# Patient Record
Sex: MALE | Race: WHITE | NOT HISPANIC OR LATINO | Employment: OTHER | URBAN - NONMETROPOLITAN AREA
[De-identification: names, ages, dates, MRNs, and addresses within clinical notes are randomized per-mention and may not be internally consistent; named-entity substitution may affect disease eponyms.]

---

## 2023-01-28 PROBLEM — G47.30 SLEEP APNEA: Status: ACTIVE | Noted: 2023-01-28

## 2023-01-28 PROBLEM — J44.9 COPD (CHRONIC OBSTRUCTIVE PULMONARY DISEASE) (MULTI): Status: ACTIVE | Noted: 2023-01-28

## 2023-01-28 PROBLEM — I10 BENIGN ESSENTIAL HYPERTENSION: Status: ACTIVE | Noted: 2023-01-28

## 2023-01-28 PROBLEM — E66.9 DIABETES MELLITUS TYPE 2 IN OBESE: Status: ACTIVE | Noted: 2023-01-28

## 2023-01-28 PROBLEM — U07.1 COVID-19: Status: ACTIVE | Noted: 2023-01-28

## 2023-01-28 PROBLEM — N52.9 ERECTILE DYSFUNCTION: Status: ACTIVE | Noted: 2023-01-28

## 2023-01-28 PROBLEM — I10 HYPERTENSION, UNCONTROLLED: Status: ACTIVE | Noted: 2023-01-28

## 2023-01-28 PROBLEM — F41.8 DEPRESSION WITH ANXIETY: Status: ACTIVE | Noted: 2023-01-28

## 2023-01-28 PROBLEM — G47.36 SLEEP RELATED HYPOVENTILATION IN CONDITIONS CLASSIFIED ELSEWHERE: Status: ACTIVE | Noted: 2023-01-28

## 2023-01-28 PROBLEM — M62.830 BACK MUSCLE SPASM: Status: ACTIVE | Noted: 2023-01-28

## 2023-01-28 PROBLEM — M79.10 MYALGIA: Status: ACTIVE | Noted: 2023-01-28

## 2023-01-28 PROBLEM — Z98.84 BARIATRIC SURGERY STATUS: Status: ACTIVE | Noted: 2023-01-28

## 2023-01-28 PROBLEM — M1A.40X0: Status: ACTIVE | Noted: 2023-01-28

## 2023-01-28 PROBLEM — G47.19 EXCESSIVE DAYTIME SLEEPINESS: Status: ACTIVE | Noted: 2023-01-28

## 2023-01-28 PROBLEM — F17.200 NICOTINE DEPENDENCE: Status: ACTIVE | Noted: 2023-01-28

## 2023-01-28 PROBLEM — R39.9 URINARY SYMPTOM OR SIGN: Status: ACTIVE | Noted: 2023-01-28

## 2023-01-28 PROBLEM — B95.8: Status: ACTIVE | Noted: 2023-01-28

## 2023-01-28 PROBLEM — M1A.9XX0 CHRONIC GOUT WITHOUT TOPHUS: Status: ACTIVE | Noted: 2023-01-28

## 2023-01-28 PROBLEM — E66.01 MORBID OBESITY WITH BMI OF 45.0-49.9, ADULT (MULTI): Status: ACTIVE | Noted: 2023-01-28

## 2023-01-28 PROBLEM — N49.2 CELLULITIS, SCROTUM: Status: ACTIVE | Noted: 2023-01-28

## 2023-01-28 PROBLEM — E78.5 HYPERLIPIDEMIA: Status: ACTIVE | Noted: 2023-01-28

## 2023-01-28 PROBLEM — L72.3 SCROTAL SEBACEOUS CYST: Status: ACTIVE | Noted: 2023-01-28

## 2023-01-28 PROBLEM — J44.1 COPD EXACERBATION (MULTI): Status: ACTIVE | Noted: 2023-01-28

## 2023-01-28 PROBLEM — A04.8 HELICOBACTER PYLORI INFECTION: Status: ACTIVE | Noted: 2023-01-28

## 2023-01-28 PROBLEM — E55.9 MILD VITAMIN D DEFICIENCY: Status: ACTIVE | Noted: 2023-01-28

## 2023-01-28 PROBLEM — I25.10 CAD (CORONARY ARTERY DISEASE): Status: ACTIVE | Noted: 2023-01-28

## 2023-01-28 PROBLEM — R73.01 IMPAIRED FASTING GLUCOSE: Status: ACTIVE | Noted: 2023-01-28

## 2023-01-28 PROBLEM — E11.69 DIABETES MELLITUS TYPE 2 IN OBESE: Status: ACTIVE | Noted: 2023-01-28

## 2023-01-28 PROBLEM — G47.33 OBSTRUCTIVE SLEEP APNEA OF ADULT: Status: ACTIVE | Noted: 2023-01-28

## 2023-01-28 PROBLEM — H00.015 HORDEOLUM EXTERNUM OF LEFT LOWER EYELID: Status: ACTIVE | Noted: 2023-01-28

## 2023-01-28 PROBLEM — A60.00 HERPES GENITALIS: Status: ACTIVE | Noted: 2023-01-28

## 2023-01-28 PROBLEM — N20.0 KIDNEY CALCULI: Status: ACTIVE | Noted: 2023-01-28

## 2023-01-28 PROBLEM — J30.0 VASOMOTOR RHINITIS: Status: ACTIVE | Noted: 2023-01-28

## 2023-01-28 PROBLEM — E03.9 HYPOTHYROID: Status: ACTIVE | Noted: 2023-01-28

## 2023-01-28 PROBLEM — I21.3 STEMI (ST ELEVATION MYOCARDIAL INFARCTION) (MULTI): Status: ACTIVE | Noted: 2023-01-28

## 2023-01-28 PROBLEM — R05.9 COUGH: Status: ACTIVE | Noted: 2023-01-28

## 2023-01-28 PROBLEM — M19.079 ARTHRITIS OF FOOT: Status: ACTIVE | Noted: 2023-01-28

## 2023-01-28 PROBLEM — E78.6 LOW HDL (UNDER 40): Status: ACTIVE | Noted: 2023-01-28

## 2023-01-28 PROBLEM — I49.3 FREQUENT PVCS: Status: ACTIVE | Noted: 2023-01-28

## 2023-01-28 PROBLEM — R31.9 HEMATURIA: Status: ACTIVE | Noted: 2023-01-28

## 2023-01-28 PROBLEM — H01.006: Status: ACTIVE | Noted: 2023-01-28

## 2023-01-28 PROBLEM — J32.9 SINUSITIS: Status: ACTIVE | Noted: 2023-01-28

## 2023-01-28 RX ORDER — FLUTICASONE FUROATE, UMECLIDINIUM BROMIDE AND VILANTEROL TRIFENATATE 100; 62.5; 25 UG/1; UG/1; UG/1
1 POWDER RESPIRATORY (INHALATION) DAILY
COMMUNITY
End: 2023-03-13 | Stop reason: ALTCHOICE

## 2023-01-28 RX ORDER — TOPIRAMATE 25 MG/1
2 TABLET ORAL DAILY
COMMUNITY
Start: 2019-01-10

## 2023-01-28 RX ORDER — CARVEDILOL 12.5 MG/1
0.5 TABLET ORAL EVERY MORNING
COMMUNITY
Start: 2019-04-19 | End: 2023-04-27 | Stop reason: SDUPTHER

## 2023-01-28 RX ORDER — ASPIRIN 81 MG/1
1 TABLET ORAL DAILY
COMMUNITY
Start: 2018-06-06

## 2023-01-28 RX ORDER — DULAGLUTIDE 0.75 MG/.5ML
1.5 INJECTION, SOLUTION SUBCUTANEOUS
COMMUNITY
Start: 2021-09-09 | End: 2023-07-26 | Stop reason: SDUPTHER

## 2023-01-28 RX ORDER — LOSARTAN POTASSIUM 100 MG/1
1 TABLET ORAL DAILY
COMMUNITY
Start: 2022-05-02 | End: 2023-04-27 | Stop reason: SDUPTHER

## 2023-01-28 RX ORDER — ROSUVASTATIN CALCIUM 40 MG/1
1 TABLET, COATED ORAL DAILY
COMMUNITY
Start: 2018-06-06 | End: 2024-04-04 | Stop reason: SINTOL

## 2023-01-28 RX ORDER — ALBUTEROL SULFATE 90 UG/1
2 AEROSOL, METERED RESPIRATORY (INHALATION)
COMMUNITY
Start: 2020-05-18 | End: 2023-04-27 | Stop reason: SDUPTHER

## 2023-01-28 RX ORDER — BUPROPION HYDROCHLORIDE 150 MG/1
1 TABLET ORAL DAILY
COMMUNITY
Start: 2019-01-10 | End: 2023-04-27 | Stop reason: SDUPTHER

## 2023-01-28 RX ORDER — ACETAMINOPHEN 500 MG
1 TABLET ORAL
COMMUNITY
Start: 2014-06-02

## 2023-01-28 RX ORDER — LEVOTHYROXINE SODIUM 50 UG/1
1 TABLET ORAL DAILY
COMMUNITY
Start: 2018-08-17 | End: 2023-03-13 | Stop reason: SDUPTHER

## 2023-01-28 RX ORDER — NITROGLYCERIN 0.4 MG/1
0.4 TABLET SUBLINGUAL EVERY 5 MIN PRN
COMMUNITY
Start: 2018-07-09 | End: 2024-04-23 | Stop reason: HOSPADM

## 2023-01-28 RX ORDER — SILDENAFIL 100 MG/1
100 TABLET, FILM COATED ORAL DAILY
COMMUNITY
Start: 2018-08-16 | End: 2023-10-18 | Stop reason: SDUPTHER

## 2023-03-10 LAB
ALANINE AMINOTRANSFERASE (SGPT) (U/L) IN SER/PLAS: 28 U/L (ref 10–52)
ALBUMIN (G/DL) IN SER/PLAS: 4.2 G/DL (ref 3.4–5)
ALBUMIN (MG/L) IN URINE: NORMAL
ALBUMIN/CREATININE (UG/MG) IN URINE: NORMAL
ALKALINE PHOSPHATASE (U/L) IN SER/PLAS: 56 U/L (ref 33–120)
ANION GAP IN SER/PLAS: 14 MMOL/L (ref 10–20)
ASPARTATE AMINOTRANSFERASE (SGOT) (U/L) IN SER/PLAS: 27 U/L (ref 9–39)
BASOPHILS (10*3/UL) IN BLOOD BY AUTOMATED COUNT: 0.07 X10E9/L (ref 0–0.1)
BASOPHILS/100 LEUKOCYTES IN BLOOD BY AUTOMATED COUNT: 0.8 % (ref 0–2)
BILIRUBIN TOTAL (MG/DL) IN SER/PLAS: 0.6 MG/DL (ref 0–1.2)
CALCIUM (MG/DL) IN SER/PLAS: 9.4 MG/DL (ref 8.6–10.3)
CARBON DIOXIDE, TOTAL (MMOL/L) IN SER/PLAS: 24 MMOL/L (ref 21–32)
CHLORIDE (MMOL/L) IN SER/PLAS: 104 MMOL/L (ref 98–107)
CHOLESTEROL (MG/DL) IN SER/PLAS: 198 MG/DL (ref 0–199)
CHOLESTEROL IN HDL (MG/DL) IN SER/PLAS: 37 MG/DL
CHOLESTEROL/HDL RATIO: 5.4
CREATININE (MG/DL) IN SER/PLAS: 1 MG/DL (ref 0.5–1.3)
CREATININE (MG/DL) IN URINE: NORMAL
EOSINOPHILS (10*3/UL) IN BLOOD BY AUTOMATED COUNT: 0.53 X10E9/L (ref 0–0.7)
EOSINOPHILS/100 LEUKOCYTES IN BLOOD BY AUTOMATED COUNT: 5.9 % (ref 0–6)
ERYTHROCYTE DISTRIBUTION WIDTH (RATIO) BY AUTOMATED COUNT: 14.2 % (ref 11.5–14.5)
ERYTHROCYTE MEAN CORPUSCULAR HEMOGLOBIN CONCENTRATION (G/DL) BY AUTOMATED: 31.6 G/DL (ref 32–36)
ERYTHROCYTE MEAN CORPUSCULAR VOLUME (FL) BY AUTOMATED COUNT: 94 FL (ref 80–100)
ERYTHROCYTES (10*6/UL) IN BLOOD BY AUTOMATED COUNT: 4.48 X10E12/L (ref 4.5–5.9)
GFR MALE: 89 ML/MIN/1.73M2
GLUCOSE (MG/DL) IN SER/PLAS: 86 MG/DL (ref 74–99)
HEMATOCRIT (%) IN BLOOD BY AUTOMATED COUNT: 42.1 % (ref 41–52)
HEMOGLOBIN (G/DL) IN BLOOD: 13.3 G/DL (ref 13.5–17.5)
IMMATURE GRANULOCYTES/100 LEUKOCYTES IN BLOOD BY AUTOMATED COUNT: 0.2 % (ref 0–0.9)
LDL: 139 MG/DL (ref 0–99)
LEUKOCYTES (10*3/UL) IN BLOOD BY AUTOMATED COUNT: 9 X10E9/L (ref 4.4–11.3)
LYMPHOCYTES (10*3/UL) IN BLOOD BY AUTOMATED COUNT: 3.22 X10E9/L (ref 1.2–4.8)
LYMPHOCYTES/100 LEUKOCYTES IN BLOOD BY AUTOMATED COUNT: 35.9 % (ref 13–44)
MAGNESIUM (MG/DL) IN SER/PLAS: 2.1 MG/DL (ref 1.6–2.4)
MONOCYTES (10*3/UL) IN BLOOD BY AUTOMATED COUNT: 0.51 X10E9/L (ref 0.1–1)
MONOCYTES/100 LEUKOCYTES IN BLOOD BY AUTOMATED COUNT: 5.7 % (ref 2–10)
NEUTROPHILS (10*3/UL) IN BLOOD BY AUTOMATED COUNT: 4.63 X10E9/L (ref 1.2–7.7)
NEUTROPHILS/100 LEUKOCYTES IN BLOOD BY AUTOMATED COUNT: 51.5 % (ref 40–80)
PLATELETS (10*3/UL) IN BLOOD AUTOMATED COUNT: 300 X10E9/L (ref 150–450)
POTASSIUM (MMOL/L) IN SER/PLAS: 3.3 MMOL/L (ref 3.5–5.3)
PROTEIN TOTAL: 6.8 G/DL (ref 6.4–8.2)
SODIUM (MMOL/L) IN SER/PLAS: 139 MMOL/L (ref 136–145)
THYROTROPIN (MIU/L) IN SER/PLAS BY DETECTION LIMIT <= 0.05 MIU/L: 4.08 MIU/L (ref 0.44–3.98)
TRIGLYCERIDE (MG/DL) IN SER/PLAS: 109 MG/DL (ref 0–149)
UREA NITROGEN (MG/DL) IN SER/PLAS: 13 MG/DL (ref 6–23)
VLDL: 22 MG/DL (ref 0–40)

## 2023-03-11 LAB
ALBUMIN (MG/L) IN URINE: 15.1 MG/L
ALBUMIN/CREATININE (UG/MG) IN URINE: 7.5 UG/MG CRT (ref 0–30)
CALCIDIOL (25 OH VITAMIN D3) (NG/ML) IN SER/PLAS: 69 NG/ML
COBALAMIN (VITAMIN B12) (PG/ML) IN SER/PLAS: 582 PG/ML (ref 211–911)
CREATININE (MG/DL) IN URINE: 201 MG/DL (ref 20–370)
PROSTATE SPECIFIC ANTIGEN,SCREEN: 0.71 NG/ML (ref 0–4)

## 2023-03-12 PROBLEM — N49.2 CELLULITIS, SCROTUM: Status: RESOLVED | Noted: 2023-01-28 | Resolved: 2023-03-12

## 2023-03-12 PROBLEM — E11.69 DIABETES MELLITUS TYPE 2 IN OBESE: Chronic | Status: ACTIVE | Noted: 2023-01-28

## 2023-03-12 PROBLEM — R05.9 COUGH: Status: RESOLVED | Noted: 2023-01-28 | Resolved: 2023-03-12

## 2023-03-12 PROBLEM — E66.01 MORBID OBESITY WITH BMI OF 45.0-49.9, ADULT (MULTI): Chronic | Status: ACTIVE | Noted: 2023-01-28

## 2023-03-12 PROBLEM — I21.3 STEMI (ST ELEVATION MYOCARDIAL INFARCTION) (MULTI): Chronic | Status: ACTIVE | Noted: 2023-01-28

## 2023-03-12 PROBLEM — R39.9 URINARY SYMPTOM OR SIGN: Status: RESOLVED | Noted: 2023-01-28 | Resolved: 2023-03-12

## 2023-03-12 PROBLEM — U07.1 COVID-19: Status: RESOLVED | Noted: 2023-01-28 | Resolved: 2023-03-12

## 2023-03-12 PROBLEM — J44.9 COPD (CHRONIC OBSTRUCTIVE PULMONARY DISEASE) (MULTI): Chronic | Status: ACTIVE | Noted: 2023-01-28

## 2023-03-12 PROBLEM — J44.1 COPD EXACERBATION (MULTI): Chronic | Status: ACTIVE | Noted: 2023-01-28

## 2023-03-12 PROBLEM — N52.9 ERECTILE DYSFUNCTION: Chronic | Status: ACTIVE | Noted: 2023-01-28

## 2023-03-12 PROBLEM — F41.8 DEPRESSION WITH ANXIETY: Chronic | Status: ACTIVE | Noted: 2023-01-28

## 2023-03-12 PROBLEM — Z98.84 BARIATRIC SURGERY STATUS: Chronic | Status: ACTIVE | Noted: 2023-01-28

## 2023-03-12 PROBLEM — G47.36 SLEEP RELATED HYPOVENTILATION IN CONDITIONS CLASSIFIED ELSEWHERE: Chronic | Status: ACTIVE | Noted: 2023-01-28

## 2023-03-12 PROBLEM — I25.10 CAD (CORONARY ARTERY DISEASE): Chronic | Status: ACTIVE | Noted: 2023-01-28

## 2023-03-12 PROBLEM — E66.9 DIABETES MELLITUS TYPE 2 IN OBESE: Chronic | Status: ACTIVE | Noted: 2023-01-28

## 2023-03-12 PROBLEM — I10 BENIGN ESSENTIAL HYPERTENSION: Chronic | Status: ACTIVE | Noted: 2023-01-28

## 2023-03-12 PROBLEM — M62.830 BACK MUSCLE SPASM: Status: RESOLVED | Noted: 2023-01-28 | Resolved: 2023-03-12

## 2023-03-12 PROBLEM — E03.9 HYPOTHYROID: Chronic | Status: ACTIVE | Noted: 2023-01-28

## 2023-03-13 ENCOUNTER — OFFICE VISIT (OUTPATIENT)
Dept: PRIMARY CARE | Facility: CLINIC | Age: 58
End: 2023-03-13
Payer: COMMERCIAL

## 2023-03-13 VITALS
DIASTOLIC BLOOD PRESSURE: 90 MMHG | HEIGHT: 71 IN | TEMPERATURE: 96.9 F | OXYGEN SATURATION: 96 % | BODY MASS INDEX: 44.1 KG/M2 | WEIGHT: 315 LBS | HEART RATE: 82 BPM | SYSTOLIC BLOOD PRESSURE: 130 MMHG

## 2023-03-13 DIAGNOSIS — Z00.00 ANNUAL PHYSICAL EXAM: Primary | Chronic | ICD-10-CM

## 2023-03-13 DIAGNOSIS — E78.49 OTHER HYPERLIPIDEMIA: ICD-10-CM

## 2023-03-13 DIAGNOSIS — I10 HYPERTENSION, UNCONTROLLED: Chronic | ICD-10-CM

## 2023-03-13 DIAGNOSIS — I21.02 ST ELEVATION MYOCARDIAL INFARCTION INVOLVING LEFT ANTERIOR DESCENDING (LAD) CORONARY ARTERY (MULTI): Chronic | ICD-10-CM

## 2023-03-13 DIAGNOSIS — E87.6 HYPOKALEMIA: Chronic | ICD-10-CM

## 2023-03-13 DIAGNOSIS — E66.9 DIABETES MELLITUS TYPE 2 IN OBESE: Chronic | ICD-10-CM

## 2023-03-13 DIAGNOSIS — E03.9 ACQUIRED HYPOTHYROIDISM: Chronic | ICD-10-CM

## 2023-03-13 DIAGNOSIS — I25.10 CORONARY ARTERY DISEASE INVOLVING NATIVE CORONARY ARTERY OF NATIVE HEART, UNSPECIFIED WHETHER ANGINA PRESENT: Chronic | ICD-10-CM

## 2023-03-13 DIAGNOSIS — E66.01 MORBID OBESITY WITH BMI OF 45.0-49.9, ADULT (MULTI): Chronic | ICD-10-CM

## 2023-03-13 DIAGNOSIS — E11.69 DIABETES MELLITUS TYPE 2 IN OBESE: Chronic | ICD-10-CM

## 2023-03-13 DIAGNOSIS — J42 CHRONIC BRONCHITIS, UNSPECIFIED CHRONIC BRONCHITIS TYPE (MULTI): Chronic | ICD-10-CM

## 2023-03-13 PROBLEM — E78.5 HYPERLIPIDEMIA: Chronic | Status: ACTIVE | Noted: 2023-01-28

## 2023-03-13 LAB
POC FINGERSTICK BLOOD GLUCOSE: 125 MG/DL (ref 70–100)
POC HEMOGLOBIN A1C: 6.1 % (ref 4.2–6.5)

## 2023-03-13 PROCEDURE — 3008F BODY MASS INDEX DOCD: CPT | Performed by: INTERNAL MEDICINE

## 2023-03-13 PROCEDURE — 1036F TOBACCO NON-USER: CPT | Performed by: INTERNAL MEDICINE

## 2023-03-13 PROCEDURE — 3075F SYST BP GE 130 - 139MM HG: CPT | Performed by: INTERNAL MEDICINE

## 2023-03-13 PROCEDURE — 3080F DIAST BP >= 90 MM HG: CPT | Performed by: INTERNAL MEDICINE

## 2023-03-13 PROCEDURE — 99215 OFFICE O/P EST HI 40 MIN: CPT | Performed by: INTERNAL MEDICINE

## 2023-03-13 PROCEDURE — 82962 GLUCOSE BLOOD TEST: CPT | Performed by: INTERNAL MEDICINE

## 2023-03-13 PROCEDURE — 83036 HEMOGLOBIN GLYCOSYLATED A1C: CPT | Performed by: INTERNAL MEDICINE

## 2023-03-13 PROCEDURE — 99396 PREV VISIT EST AGE 40-64: CPT | Performed by: INTERNAL MEDICINE

## 2023-03-13 PROCEDURE — 4010F ACE/ARB THERAPY RXD/TAKEN: CPT | Performed by: INTERNAL MEDICINE

## 2023-03-13 RX ORDER — POTASSIUM CHLORIDE 750 MG/1
10 TABLET, FILM COATED, EXTENDED RELEASE ORAL DAILY
Qty: 30 TABLET | Refills: 11 | Status: SHIPPED | OUTPATIENT
Start: 2023-03-13 | End: 2023-03-13

## 2023-03-13 RX ORDER — LEVOTHYROXINE SODIUM 50 UG/1
75 TABLET ORAL
Qty: 90 TABLET | Refills: 1 | Status: SHIPPED | OUTPATIENT
Start: 2023-03-13 | End: 2023-10-18 | Stop reason: SDUPTHER

## 2023-03-13 ASSESSMENT — ENCOUNTER SYMPTOMS
BRUISES/BLEEDS EASILY: 0
COUGH: 0
DIFFICULTY URINATING: 0
HEADACHES: 0
DEPRESSION: 0
BLOOD IN STOOL: 0
ABDOMINAL PAIN: 0
SORE THROAT: 0
ARTHRALGIAS: 0
DIARRHEA: 0
PALPITATIONS: 0
UNEXPECTED WEIGHT CHANGE: 0
OCCASIONAL FEELINGS OF UNSTEADINESS: 0
FEVER: 0
FATIGUE: 0
WHEEZING: 0
LOSS OF SENSATION IN FEET: 0
DIZZINESS: 0
SINUS PAIN: 0

## 2023-03-13 ASSESSMENT — PATIENT HEALTH QUESTIONNAIRE - PHQ9
2. FEELING DOWN, DEPRESSED OR HOPELESS: NOT AT ALL
SUM OF ALL RESPONSES TO PHQ9 QUESTIONS 1 AND 2: 0
1. LITTLE INTEREST OR PLEASURE IN DOING THINGS: NOT AT ALL

## 2023-03-13 NOTE — PROGRESS NOTES
"Subjective   Patient ID: Lyle Oh is a 57 y.o. male who presents for Annual Exam, COPD, Diabetes, Hypertension, and Results.    Annual preventive visit  - Vaccinations patient declined all vaccines  -Screening colonoscopy up-to-date  -\"I spent more 10 minutes counseling patient about need for smoking/tobacco cessation and how I can support efforts when patient is ready to quit.  Discussed nicotine replacement therapy, Varenicline, Bupropion, hypnosis, support groups, and accupunture as potential options.  Patient currently has no signs or symptoms of tobacco related disease.\".  -Blood work reviewed with patient  -Uncontrolled hypothyroid  Increase levothyroxine to 75 mcg daily  -         Review of Systems   Constitutional:  Negative for fatigue, fever and unexpected weight change.   HENT:  Negative for congestion, ear discharge, ear pain, mouth sores, sinus pain and sore throat.    Eyes:  Negative for visual disturbance.   Respiratory:  Negative for cough and wheezing.    Cardiovascular:  Negative for chest pain, palpitations and leg swelling.   Gastrointestinal:  Negative for abdominal pain, blood in stool and diarrhea.   Genitourinary:  Negative for difficulty urinating.   Musculoskeletal:  Negative for arthralgias.   Skin:  Negative for rash.   Neurological:  Negative for dizziness and headaches.   Hematological:  Does not bruise/bleed easily.   Psychiatric/Behavioral:  Negative for behavioral problems.    All other systems reviewed and are negative.      Objective   BP (!) 152/94 (BP Location: Left arm, Patient Position: Sitting)   Pulse 82   Temp 36.1 °C (96.9 °F)   Ht 1.803 m (5' 11\")   Wt (!) 150 kg (330 lb 3.2 oz)   SpO2 96%   BMI 46.05 kg/m²     Physical Exam  Vitals and nursing note reviewed.   Constitutional:       Appearance: Normal appearance.   HENT:      Head: Normocephalic.      Nose: Nose normal.   Eyes:      Conjunctiva/sclera: Conjunctivae normal.      Pupils: Pupils are equal, " round, and reactive to light.   Cardiovascular:      Rate and Rhythm: Regular rhythm.   Pulmonary:      Effort: Pulmonary effort is normal.      Breath sounds: Normal breath sounds.   Abdominal:      General: Abdomen is flat.      Palpations: Abdomen is soft.   Musculoskeletal:      Cervical back: Neck supple.   Skin:     General: Skin is warm.   Neurological:      General: No focal deficit present.      Mental Status: He is oriented to person, place, and time.   Psychiatric:         Mood and Affect: Mood normal.         Assessment/Plan   Problem List Items Addressed This Visit          Respiratory    COPD (chronic obstructive pulmonary disease) (CMS/[facility]) (Chronic)     Uncontrolled increase Trelegy to higher dose 200 follow-up progress closely         Relevant Medications    fluticasone-umeclidin-vilanter (TRELEGY-ELLIPTA) 200-62.5-25 mcg blister with device       Circulatory    CAD (coronary artery disease) (Chronic)     Controlled , continue current meds           STEMI (ST elevation myocardial infarction) (CMS/[facility]) (Chronic)     Controlled , continue current meds           Hypertension, uncontrolled     Controlled , continue current meds              Endocrine/Metabolic    Diabetes mellitus type 2 in obese (CMS/[facility]) (Chronic)     Controlled , continue current meds           Relevant Orders    POCT fingerstick glucose manually resulted (Completed)    POCT glycosylated hemoglobin (Hb A1C) manually resulted (Completed)    Hypothyroid (Chronic)     Increase levothyroxine to 75 mcg follow-up thyroid function test in 3 months         Relevant Medications    levothyroxine (Synthroid, Levoxyl) 50 mcg tablet    potassium chloride CR (Klor-Con) 10 mEq ER tablet    Other Relevant Orders    TSH with reflex to Free T4 if abnormal    Morbid obesity with BMI of 45.0-49.9, adult (CMS/[facility]) (Chronic)     I spent >15minutes minutes face to face with individial providing recommendations for nutrition choices and exercise plan to  help achieve weight reduction.            Other    Hyperlipidemia (Chronic)    Hypokalemia (Chronic)     Start on potassium 10 mill equivalent daily follow-up BMP         Relevant Orders    Basic metabolic panel     Other Visit Diagnoses       Annual physical exam  (Chronic)   -  Primary

## 2023-03-13 NOTE — ASSESSMENT & PLAN NOTE
I spent >15minutes minutes face to face with individial providing recommendations for nutrition choices and exercise plan to help achieve weight reduction.

## 2023-04-27 DIAGNOSIS — I10 HYPERTENSION, UNCONTROLLED: ICD-10-CM

## 2023-04-27 DIAGNOSIS — J44.9 CHRONIC OBSTRUCTIVE PULMONARY DISEASE, UNSPECIFIED COPD TYPE (MULTI): Chronic | ICD-10-CM

## 2023-04-27 DIAGNOSIS — F41.8 DEPRESSION WITH ANXIETY: Chronic | ICD-10-CM

## 2023-04-27 RX ORDER — CARVEDILOL 12.5 MG/1
6.25 TABLET ORAL EVERY MORNING
Qty: 45 TABLET | Refills: 1 | Status: SHIPPED | OUTPATIENT
Start: 2023-04-27 | End: 2023-04-27

## 2023-04-27 RX ORDER — BUPROPION HYDROCHLORIDE 150 MG/1
150 TABLET ORAL EVERY MORNING
Qty: 90 TABLET | Refills: 1 | Status: SHIPPED | OUTPATIENT
Start: 2023-04-27 | End: 2023-04-27

## 2023-04-27 RX ORDER — LOSARTAN POTASSIUM 100 MG/1
100 TABLET ORAL DAILY
Qty: 90 TABLET | Refills: 1 | Status: SHIPPED | OUTPATIENT
Start: 2023-04-27 | End: 2023-04-27

## 2023-04-27 RX ORDER — ALBUTEROL SULFATE 90 UG/1
2 AEROSOL, METERED RESPIRATORY (INHALATION) EVERY 4 HOURS PRN
Qty: 54 G | Refills: 1 | Status: SHIPPED | OUTPATIENT
Start: 2023-04-27 | End: 2023-09-21 | Stop reason: SDUPTHER

## 2023-06-14 ENCOUNTER — OFFICE VISIT (OUTPATIENT)
Dept: PRIMARY CARE | Facility: CLINIC | Age: 58
End: 2023-06-14
Payer: COMMERCIAL

## 2023-06-14 ENCOUNTER — LAB (OUTPATIENT)
Dept: LAB | Facility: LAB | Age: 58
End: 2023-06-14
Payer: COMMERCIAL

## 2023-06-14 VITALS
OXYGEN SATURATION: 98 % | TEMPERATURE: 98.6 F | BODY MASS INDEX: 44.1 KG/M2 | HEART RATE: 80 BPM | SYSTOLIC BLOOD PRESSURE: 118 MMHG | DIASTOLIC BLOOD PRESSURE: 80 MMHG | HEIGHT: 71 IN | WEIGHT: 315 LBS

## 2023-06-14 DIAGNOSIS — G47.30 SLEEP APNEA, UNSPECIFIED TYPE: ICD-10-CM

## 2023-06-14 DIAGNOSIS — E03.9 ACQUIRED HYPOTHYROIDISM: Chronic | ICD-10-CM

## 2023-06-14 DIAGNOSIS — J44.9 CHRONIC OBSTRUCTIVE PULMONARY DISEASE, UNSPECIFIED COPD TYPE (MULTI): Chronic | ICD-10-CM

## 2023-06-14 DIAGNOSIS — I25.10 CORONARY ARTERY DISEASE INVOLVING NATIVE CORONARY ARTERY OF NATIVE HEART, UNSPECIFIED WHETHER ANGINA PRESENT: Chronic | ICD-10-CM

## 2023-06-14 DIAGNOSIS — E66.9 DIABETES MELLITUS TYPE 2 IN OBESE: Chronic | ICD-10-CM

## 2023-06-14 DIAGNOSIS — E87.6 HYPOKALEMIA: Chronic | ICD-10-CM

## 2023-06-14 DIAGNOSIS — I10 HYPERTENSION, UNCONTROLLED: ICD-10-CM

## 2023-06-14 DIAGNOSIS — Z00.00 ANNUAL PHYSICAL EXAM: Primary | ICD-10-CM

## 2023-06-14 DIAGNOSIS — E11.69 DIABETES MELLITUS TYPE 2 IN OBESE: Chronic | ICD-10-CM

## 2023-06-14 PROBLEM — R73.01 IMPAIRED FASTING GLUCOSE: Status: RESOLVED | Noted: 2023-01-28 | Resolved: 2023-06-14

## 2023-06-14 PROBLEM — A60.00 HERPES SIMPLEX INFECTION OF GENITOURINARY SYSTEM: Status: RESOLVED | Noted: 2023-01-28 | Resolved: 2023-06-14

## 2023-06-14 PROBLEM — G47.19 EXCESSIVE DAYTIME SLEEPINESS: Status: RESOLVED | Noted: 2023-01-28 | Resolved: 2023-06-14

## 2023-06-14 PROBLEM — H00.015 HORDEOLUM EXTERNUM OF LEFT LOWER EYELID: Status: RESOLVED | Noted: 2023-01-28 | Resolved: 2023-06-14

## 2023-06-14 PROBLEM — H01.006: Status: RESOLVED | Noted: 2023-01-28 | Resolved: 2023-06-14

## 2023-06-14 PROBLEM — R31.9 HEMATURIA: Status: RESOLVED | Noted: 2023-01-28 | Resolved: 2023-06-14

## 2023-06-14 PROBLEM — B95.8: Status: RESOLVED | Noted: 2023-01-28 | Resolved: 2023-06-14

## 2023-06-14 PROBLEM — M19.079 ARTHRITIS OF FOOT: Status: RESOLVED | Noted: 2023-01-28 | Resolved: 2023-06-14

## 2023-06-14 LAB
ANION GAP IN SER/PLAS: 11 MMOL/L (ref 10–20)
CALCIUM (MG/DL) IN SER/PLAS: 9.1 MG/DL (ref 8.6–10.3)
CARBON DIOXIDE, TOTAL (MMOL/L) IN SER/PLAS: 24 MMOL/L (ref 21–32)
CHLORIDE (MMOL/L) IN SER/PLAS: 108 MMOL/L (ref 98–107)
CREATININE (MG/DL) IN SER/PLAS: 0.93 MG/DL (ref 0.5–1.3)
GFR MALE: >90 ML/MIN/1.73M2
GLUCOSE (MG/DL) IN SER/PLAS: 131 MG/DL (ref 74–99)
POC FINGERSTICK BLOOD GLUCOSE: 131 MG/DL (ref 70–100)
POC HEMOGLOBIN A1C: 6 % (ref 4.2–6.5)
POTASSIUM (MMOL/L) IN SER/PLAS: 3.8 MMOL/L (ref 3.5–5.3)
SODIUM (MMOL/L) IN SER/PLAS: 139 MMOL/L (ref 136–145)
THYROTROPIN (MIU/L) IN SER/PLAS BY DETECTION LIMIT <= 0.05 MIU/L: 3.46 MIU/L (ref 0.44–3.98)
UREA NITROGEN (MG/DL) IN SER/PLAS: 16 MG/DL (ref 6–23)

## 2023-06-14 PROCEDURE — 3008F BODY MASS INDEX DOCD: CPT | Performed by: INTERNAL MEDICINE

## 2023-06-14 PROCEDURE — 1036F TOBACCO NON-USER: CPT | Performed by: INTERNAL MEDICINE

## 2023-06-14 PROCEDURE — 4010F ACE/ARB THERAPY RXD/TAKEN: CPT | Performed by: INTERNAL MEDICINE

## 2023-06-14 PROCEDURE — 3074F SYST BP LT 130 MM HG: CPT | Performed by: INTERNAL MEDICINE

## 2023-06-14 PROCEDURE — 3079F DIAST BP 80-89 MM HG: CPT | Performed by: INTERNAL MEDICINE

## 2023-06-14 PROCEDURE — 99214 OFFICE O/P EST MOD 30 MIN: CPT | Performed by: INTERNAL MEDICINE

## 2023-06-14 PROCEDURE — 36415 COLL VENOUS BLD VENIPUNCTURE: CPT

## 2023-06-14 PROCEDURE — 99396 PREV VISIT EST AGE 40-64: CPT | Performed by: INTERNAL MEDICINE

## 2023-06-14 PROCEDURE — 82962 GLUCOSE BLOOD TEST: CPT | Performed by: INTERNAL MEDICINE

## 2023-06-14 PROCEDURE — 80048 BASIC METABOLIC PNL TOTAL CA: CPT

## 2023-06-14 PROCEDURE — 84443 ASSAY THYROID STIM HORMONE: CPT

## 2023-06-14 PROCEDURE — 83036 HEMOGLOBIN GLYCOSYLATED A1C: CPT | Performed by: INTERNAL MEDICINE

## 2023-06-14 RX ORDER — FLUTICASONE PROPIONATE AND SALMETEROL 250; 50 UG/1; UG/1
1 POWDER RESPIRATORY (INHALATION)
Qty: 60 EACH | Refills: 11 | Status: SHIPPED | OUTPATIENT
Start: 2023-06-14 | End: 2023-06-19 | Stop reason: ALTCHOICE

## 2023-06-14 RX ORDER — TIOTROPIUM BROMIDE 18 UG/1
1 CAPSULE ORAL; RESPIRATORY (INHALATION)
Qty: 30 CAPSULE | Refills: 5 | Status: SHIPPED | OUTPATIENT
Start: 2023-06-14 | End: 2023-06-19 | Stop reason: ALTCHOICE

## 2023-06-14 ASSESSMENT — ANXIETY QUESTIONNAIRES
6. BECOMING EASILY ANNOYED OR IRRITABLE: NOT AT ALL
7. FEELING AFRAID AS IF SOMETHING AWFUL MIGHT HAPPEN: NOT AT ALL
1. FEELING NERVOUS, ANXIOUS, OR ON EDGE: NOT AT ALL
4. TROUBLE RELAXING: NOT AT ALL
2. NOT BEING ABLE TO STOP OR CONTROL WORRYING: NOT AT ALL
GAD7 TOTAL SCORE: 0
5. BEING SO RESTLESS THAT IT IS HARD TO SIT STILL: NOT AT ALL
3. WORRYING TOO MUCH ABOUT DIFFERENT THINGS: NOT AT ALL
IF YOU CHECKED OFF ANY PROBLEMS ON THIS QUESTIONNAIRE, HOW DIFFICULT HAVE THESE PROBLEMS MADE IT FOR YOU TO DO YOUR WORK, TAKE CARE OF THINGS AT HOME, OR GET ALONG WITH OTHER PEOPLE: NOT DIFFICULT AT ALL

## 2023-06-14 ASSESSMENT — ENCOUNTER SYMPTOMS
FATIGUE: 0
DEPRESSION: 0
ABDOMINAL PAIN: 0
WHEEZING: 1
BLOOD IN STOOL: 0
SORE THROAT: 0
OCCASIONAL FEELINGS OF UNSTEADINESS: 0
PALPITATIONS: 0
UNEXPECTED WEIGHT CHANGE: 0
HEADACHES: 0
DIZZINESS: 0
COUGH: 0
FEVER: 0
BRUISES/BLEEDS EASILY: 0
ARTHRALGIAS: 0
DIARRHEA: 0
DIFFICULTY URINATING: 0
SINUS PAIN: 0
LOSS OF SENSATION IN FEET: 0

## 2023-06-14 ASSESSMENT — PATIENT HEALTH QUESTIONNAIRE - PHQ9
1. LITTLE INTEREST OR PLEASURE IN DOING THINGS: NOT AT ALL
SUM OF ALL RESPONSES TO PHQ9 QUESTIONS 1 AND 2: 0
2. FEELING DOWN, DEPRESSED OR HOPELESS: NOT AT ALL

## 2023-06-14 NOTE — PROGRESS NOTES
Subjective   Patient ID: Lyle Oh is a 57 y.o. male who presents for Annual Exam.    Annual preventive visit  -Vaccinations reviewed declined Shingrix vaccine aware of risk of benefit  - Screening for colon cancer complaint  Patient to repeat in 10 years 2030  -Morbid obesity counseled about BMI  I spent >15minutes minutes face to face with individial providing recommendations for nutrition choices and exercise plan to help achieve weight reduction.  COPD (chronic obstructive pulmonary disease) (CMS/[facility]) (Chronic)   - Labs reviewed up-to-date continue with current medication  - Screening for depression  Negative    Follow-up  - Obstructive sleep apnea compensated continue CPAP  -COPD uncontrolled patient now on Trelegy without improvement  Needs to try Advair discus 250 twice a day  Add also Spiriva once a day follow-up progress closely  -Coronary artery disease no chest pain no shortness of breath maximize medical management continue with aspirin daily  -Hypertension controlled  -Hypercholesterolemia improved continue low-fat diet  - Hypothyroid compensated continue levothyroxine  - Hypokalemia continue with potassium 10 mill equivalent daily continues high potassium diet  -Diabetes controlled hemoglobin A1c 6 random sugar controlled continue low-carb diet improving  Follow-up 6 months                   Review of Systems   Constitutional:  Negative for fatigue, fever and unexpected weight change.   HENT:  Negative for congestion, ear discharge, ear pain, mouth sores, sinus pain and sore throat.    Eyes:  Negative for visual disturbance.   Respiratory:  Positive for wheezing. Negative for cough.    Cardiovascular:  Negative for chest pain, palpitations and leg swelling.   Gastrointestinal:  Negative for abdominal pain, blood in stool and diarrhea.   Genitourinary:  Negative for difficulty urinating.   Musculoskeletal:  Negative for arthralgias.   Skin:  Negative for rash.   Neurological:  Negative for  "dizziness and headaches.   Hematological:  Does not bruise/bleed easily.   Psychiatric/Behavioral:  Negative for behavioral problems.    All other systems reviewed and are negative.      Objective   Lab Results   Component Value Date    HGBA1C 6.0 06/14/2023      /80   Pulse 80   Temp 37 °C (98.6 °F)   Ht 1.803 m (5' 11\")   Wt 148 kg (326 lb 3.2 oz)   SpO2 98%   BMI 45.50 kg/m²   Lab Results   Component Value Date    WBC 9.0 03/10/2023    HGB 13.3 (L) 03/10/2023    HCT 42.1 03/10/2023     03/10/2023    CHOL 198 03/10/2023    TRIG 109 03/10/2023    HDL 37.0 (A) 03/10/2023    ALT 28 03/10/2023    AST 27 03/10/2023     06/14/2023    K 3.8 06/14/2023     (H) 06/14/2023    CREATININE 0.93 06/14/2023    BUN 16 06/14/2023    CO2 24 06/14/2023    TSH 3.46 06/14/2023    INR 0.9 06/17/2019    HGBA1C 6.0 06/14/2023     par   Physical Exam  Vitals and nursing note reviewed.   Constitutional:       Appearance: Normal appearance.   HENT:      Head: Normocephalic.      Nose: Nose normal.   Eyes:      Conjunctiva/sclera: Conjunctivae normal.      Pupils: Pupils are equal, round, and reactive to light.   Cardiovascular:      Rate and Rhythm: Regular rhythm.   Pulmonary:      Effort: Pulmonary effort is normal.      Breath sounds: Wheezing (Scattered wheezing) present.   Abdominal:      General: Abdomen is flat.      Palpations: Abdomen is soft.   Musculoskeletal:      Cervical back: Neck supple.   Skin:     General: Skin is warm.   Neurological:      General: No focal deficit present.      Mental Status: He is oriented to person, place, and time.   Psychiatric:         Mood and Affect: Mood normal.         Assessment/Plan   Lyle was seen today for annual exam.  Diagnoses and all orders for this visit:  Annual physical exam (Primary)  Hypokalemia  -     Basic metabolic panel; Future  Acquired hypothyroidism  -     Tsh With Reflex To Free T4 If Abnormal; Future  Diabetes mellitus type 2 in obese " (CMS/[facility])  -     POCT glycosylated hemoglobin (Hb A1C) manually resulted  -     POCT fingerstick glucose manually resulted  Chronic obstructive pulmonary disease, unspecified COPD type (CMS/[facility])  -     fluticasone propion-salmeteroL (Advair Diskus) 250-50 mcg/dose diskus inhaler; Inhale 1 puff 2 times a day. Rinse mouth with water after use to reduce aftertaste and incidence of candidiasis. Do not swallow.  -     tiotropium (Spiriva) 18 mcg inhalation capsule; Place 1 capsule (18 mcg) into inhaler and inhale once daily.  Sleep apnea, unspecified type  Hypertension, uncontrolled  Coronary artery disease involving native coronary artery of native heart, unspecified whether angina present  Other orders  -     Follow Up In Primary Care; Future   Annual preventive visit  -Vaccinations reviewed declined Shingrix vaccine aware of risk of benefit  - Screening for colon cancer complaint  Patient to repeat in 10 years 2030  -Morbid obesity counseled about BMI  I spent >15minutes minutes face to face with individial providing recommendations for nutrition choices and exercise plan to help achieve weight reduction.  COPD (chronic obstructive pulmonary disease) (CMS/[facility]) (Chronic)   - Labs reviewed up-to-date continue with current medication  - Screening for depression  Negative    Follow-up  - Obstructive sleep apnea compensated continue CPAP  -COPD uncontrolled patient now on Trelegy without improvement  Needs to try Advair discus 250 twice a day  Add also Spiriva once a day follow-up progress closely  -Coronary artery disease no chest pain no shortness of breath maximize medical management continue with aspirin daily  -Hypertension controlled  -Hypercholesterolemia improved continue low-fat diet  - Hypothyroid compensated continue levothyroxine  - Hypokalemia continue with potassium 10 mill equivalent daily continues high potassium diet  -Diabetes controlled hemoglobin A1c 6 random sugar controlled continue low-carb diet  improving  Follow-up 6 months

## 2023-06-19 DIAGNOSIS — J44.9 CHRONIC OBSTRUCTIVE PULMONARY DISEASE, UNSPECIFIED COPD TYPE (MULTI): Chronic | ICD-10-CM

## 2023-06-19 RX ORDER — FLUTICASONE FUROATE AND VILANTEROL 200; 25 UG/1; UG/1
1 POWDER RESPIRATORY (INHALATION) DAILY
COMMUNITY
End: 2023-06-19 | Stop reason: SDUPTHER

## 2023-06-19 RX ORDER — FLUTICASONE FUROATE AND VILANTEROL 200; 25 UG/1; UG/1
1 POWDER RESPIRATORY (INHALATION) DAILY
Qty: 60 EACH | Refills: 2 | Status: SHIPPED | OUTPATIENT
Start: 2023-06-19 | End: 2023-06-19

## 2023-07-26 DIAGNOSIS — E11.69 DIABETES MELLITUS TYPE 2 IN OBESE: Chronic | ICD-10-CM

## 2023-07-26 DIAGNOSIS — E66.9 DIABETES MELLITUS TYPE 2 IN OBESE: Chronic | ICD-10-CM

## 2023-07-27 ENCOUNTER — TELEPHONE (OUTPATIENT)
Dept: PRIMARY CARE | Facility: CLINIC | Age: 58
End: 2023-07-27
Payer: COMMERCIAL

## 2023-07-27 DIAGNOSIS — E66.9 DIABETES MELLITUS TYPE 2 IN OBESE: Chronic | ICD-10-CM

## 2023-07-27 DIAGNOSIS — E11.69 DIABETES MELLITUS TYPE 2 IN OBESE: Chronic | ICD-10-CM

## 2023-07-27 RX ORDER — DULAGLUTIDE 1.5 MG/.5ML
1.5 INJECTION, SOLUTION SUBCUTANEOUS
COMMUNITY
Start: 2023-07-27 | End: 2023-12-14

## 2023-07-27 RX ORDER — DULAGLUTIDE 0.75 MG/.5ML
1.5 INJECTION, SOLUTION SUBCUTANEOUS
Qty: 6 ML | Refills: 1 | Status: SHIPPED | OUTPATIENT
Start: 2023-07-27 | End: 2023-07-27 | Stop reason: CLARIF

## 2023-08-24 DIAGNOSIS — J44.9 CHRONIC OBSTRUCTIVE PULMONARY DISEASE, UNSPECIFIED COPD TYPE (MULTI): Chronic | ICD-10-CM

## 2023-08-24 RX ORDER — TIOTROPIUM BROMIDE INHALATION SPRAY 3.12 UG/1
2 SPRAY, METERED RESPIRATORY (INHALATION) DAILY
COMMUNITY
End: 2023-08-24 | Stop reason: SDUPTHER

## 2023-08-24 RX ORDER — TIOTROPIUM BROMIDE INHALATION SPRAY 3.12 UG/1
2 SPRAY, METERED RESPIRATORY (INHALATION) DAILY
Qty: 1 EACH | Refills: 2 | Status: SHIPPED | OUTPATIENT
Start: 2023-08-24 | End: 2023-10-03 | Stop reason: ALTCHOICE

## 2023-09-21 DIAGNOSIS — J44.9 CHRONIC OBSTRUCTIVE PULMONARY DISEASE, UNSPECIFIED COPD TYPE (MULTI): Chronic | ICD-10-CM

## 2023-09-22 RX ORDER — ALBUTEROL SULFATE 90 UG/1
2 AEROSOL, METERED RESPIRATORY (INHALATION) EVERY 4 HOURS PRN
Qty: 54 G | Refills: 1 | Status: SHIPPED | OUTPATIENT
Start: 2023-09-22 | End: 2023-09-22

## 2023-10-03 ENCOUNTER — PHARMACY VISIT (OUTPATIENT)
Dept: PHARMACY | Facility: CLINIC | Age: 58
End: 2023-10-03
Payer: COMMERCIAL

## 2023-10-03 ENCOUNTER — HOSPITAL ENCOUNTER (OUTPATIENT)
Dept: RADIOLOGY | Facility: HOSPITAL | Age: 58
Discharge: HOME | End: 2023-10-03
Payer: COMMERCIAL

## 2023-10-03 ENCOUNTER — OFFICE VISIT (OUTPATIENT)
Dept: PRIMARY CARE | Facility: CLINIC | Age: 58
End: 2023-10-03
Payer: COMMERCIAL

## 2023-10-03 VITALS
HEART RATE: 84 BPM | DIASTOLIC BLOOD PRESSURE: 100 MMHG | SYSTOLIC BLOOD PRESSURE: 140 MMHG | WEIGHT: 315 LBS | TEMPERATURE: 97.1 F | OXYGEN SATURATION: 97 % | BODY MASS INDEX: 45.55 KG/M2

## 2023-10-03 DIAGNOSIS — E11.69 DIABETES MELLITUS TYPE 2 IN OBESE: ICD-10-CM

## 2023-10-03 DIAGNOSIS — I25.10 CORONARY ARTERY DISEASE INVOLVING NATIVE CORONARY ARTERY OF NATIVE HEART, UNSPECIFIED WHETHER ANGINA PRESENT: Chronic | ICD-10-CM

## 2023-10-03 DIAGNOSIS — J44.1 CHRONIC OBSTRUCTIVE PULMONARY DISEASE WITH ACUTE EXACERBATION (MULTI): Chronic | ICD-10-CM

## 2023-10-03 DIAGNOSIS — M17.11 ARTHRITIS OF RIGHT KNEE: Primary | ICD-10-CM

## 2023-10-03 DIAGNOSIS — E66.9 DIABETES MELLITUS TYPE 2 IN OBESE: ICD-10-CM

## 2023-10-03 DIAGNOSIS — M17.11 ARTHRITIS OF RIGHT KNEE: ICD-10-CM

## 2023-10-03 PROCEDURE — 3080F DIAST BP >= 90 MM HG: CPT | Performed by: INTERNAL MEDICINE

## 2023-10-03 PROCEDURE — 1036F TOBACCO NON-USER: CPT | Performed by: INTERNAL MEDICINE

## 2023-10-03 PROCEDURE — 3077F SYST BP >= 140 MM HG: CPT | Performed by: INTERNAL MEDICINE

## 2023-10-03 PROCEDURE — 73564 X-RAY EXAM KNEE 4 OR MORE: CPT | Mod: RT

## 2023-10-03 PROCEDURE — 73564 X-RAY EXAM KNEE 4 OR MORE: CPT | Mod: RIGHT SIDE | Performed by: RADIOLOGY

## 2023-10-03 PROCEDURE — RXMED WILLOW AMBULATORY MEDICATION CHARGE

## 2023-10-03 PROCEDURE — 3008F BODY MASS INDEX DOCD: CPT | Performed by: INTERNAL MEDICINE

## 2023-10-03 PROCEDURE — 4010F ACE/ARB THERAPY RXD/TAKEN: CPT | Performed by: INTERNAL MEDICINE

## 2023-10-03 PROCEDURE — 99214 OFFICE O/P EST MOD 30 MIN: CPT | Performed by: INTERNAL MEDICINE

## 2023-10-03 PROCEDURE — 73562 X-RAY EXAM OF KNEE 3: CPT | Mod: RIGHT SIDE | Performed by: RADIOLOGY

## 2023-10-03 RX ORDER — ACLIDINIUM BROMIDE 400 UG/1
1 POWDER, METERED RESPIRATORY (INHALATION)
COMMUNITY
End: 2023-10-03 | Stop reason: SDUPTHER

## 2023-10-03 RX ORDER — PREDNISONE 20 MG/1
20 TABLET ORAL DAILY
Qty: 7 TABLET | Refills: 0 | Status: SHIPPED | OUTPATIENT
Start: 2023-10-03 | End: 2023-10-10

## 2023-10-03 RX ORDER — ACLIDINIUM BROMIDE 400 UG/1
1 POWDER, METERED RESPIRATORY (INHALATION)
Qty: 1 EACH | Refills: 2 | Status: SHIPPED | OUTPATIENT
Start: 2023-10-03 | End: 2024-03-07 | Stop reason: SDUPTHER

## 2023-10-03 RX ORDER — MELOXICAM 15 MG/1
15 TABLET ORAL DAILY
Qty: 30 TABLET | Refills: 0 | Status: SHIPPED | OUTPATIENT
Start: 2023-10-03 | End: 2023-10-03 | Stop reason: ENTERED-IN-ERROR

## 2023-10-03 ASSESSMENT — ENCOUNTER SYMPTOMS
FATIGUE: 0
ARTHRALGIAS: 0
WHEEZING: 0
HEADACHES: 0
PALPITATIONS: 0
COUGH: 0
DIARRHEA: 0
BLOOD IN STOOL: 0
UNEXPECTED WEIGHT CHANGE: 0
ABDOMINAL PAIN: 0
BRUISES/BLEEDS EASILY: 0
JOINT SWELLING: 1
SINUS PAIN: 0
SORE THROAT: 0
FEVER: 0
DIZZINESS: 0
DIFFICULTY URINATING: 0

## 2023-10-03 NOTE — PROGRESS NOTES
Subjective   Patient ID: Lyle Oh is a 57 y.o. male who presents for Knee Pain (Right knee, aches, swelling, pops, happened in June, tried to let it heal on its own) and COPD (Patient would like to go back on Tudorda).    Right knee pain for the last 3 months after over use patient occasionally feeling the knee is locking takes Aleve daily without improvement  - Underlying physical artery disease and 2 stents placed patient high risk for NSAIDs need to discontinue immediately  - Right knee pain and swelling mild arthritis patient need to obtain x-ray today  Starting prednisone 20 mg daily  - Obstructive sleep apnea compensated continue CPAP  -COPD uncontrolled did not improve with Trelegy switch patient to the dose again  -Coronary artery disease no chest pain no shortness of breath maximize medical management continue with aspirin daily  -Hypertension controlled  -Hypercholesterolemia improved continue low-fat diet continue with low-fat diet  - Hypothyroid compensated continue levothyroxine  - Hypokalemia continue with potassium 10 mill equivalent daily continues high potassium diet, controlled  -Diabetes controlled hemoglobin A1c 6 random sugar controlled continue low-carb diet improving continue with current medication  Follow-up in 1 months    Knee Pain            Review of Systems   Constitutional:  Negative for fatigue, fever and unexpected weight change.   HENT:  Negative for congestion, ear discharge, ear pain, mouth sores, sinus pain and sore throat.    Eyes:  Negative for visual disturbance.   Respiratory:  Negative for cough and wheezing.    Cardiovascular:  Negative for chest pain, palpitations and leg swelling.   Gastrointestinal:  Negative for abdominal pain, blood in stool and diarrhea.   Genitourinary:  Negative for difficulty urinating.   Musculoskeletal:  Positive for joint swelling. Negative for arthralgias.   Skin:  Negative for rash.   Neurological:  Negative for dizziness and  headaches.   Hematological:  Does not bruise/bleed easily.   Psychiatric/Behavioral:  Negative for behavioral problems.    All other systems reviewed and are negative.      Objective   Lab Results   Component Value Date    HGBA1C 6.0 06/14/2023      BP (!) 140/100   Pulse 84   Temp 36.2 °C (97.1 °F)   Wt 148 kg (326 lb 9.6 oz)   SpO2 97%   BMI 45.55 kg/m²     Physical Exam  Vitals and nursing note reviewed.   Constitutional:       Appearance: Normal appearance.   HENT:      Head: Normocephalic.      Nose: Nose normal.   Eyes:      Conjunctiva/sclera: Conjunctivae normal.      Pupils: Pupils are equal, round, and reactive to light.   Cardiovascular:      Rate and Rhythm: Regular rhythm.   Pulmonary:      Effort: Pulmonary effort is normal.      Breath sounds: Normal breath sounds.   Abdominal:      General: Abdomen is flat.      Palpations: Abdomen is soft.   Musculoskeletal:         General: Swelling present.      Cervical back: Neck supple.   Skin:     General: Skin is warm.   Neurological:      General: No focal deficit present.      Mental Status: He is oriented to person, place, and time.   Psychiatric:         Mood and Affect: Mood normal.         Assessment/Plan   Lyle was seen today for knee pain and copd.  Diagnoses and all orders for this visit:  Arthritis of right knee (Primary)  -     Discontinue: meloxicam (Mobic) 15 mg tablet; Take 1 tablet (15 mg) by mouth once daily.  -     XR knee right 4+ views; Future  -     predniSONE (Deltasone) 20 mg tablet; Take 1 tablet (20 mg) by mouth once daily for 7 days.  Chronic obstructive pulmonary disease with acute exacerbation (CMS/[facility])  -     aclidinium (Tudorza Pressair) 400 mcg/actuation inhaler; Inhale 1 puff 2 times a day.  Coronary artery disease involving native coronary artery of native heart, unspecified whether angina present  Other orders  -     Follow Up In Primary Care - Established; Future   Right knee pain for the last 3 months after over use  patient occasionally feeling the knee is locking takes Aleve daily without improvement  - Underlying physical artery disease and 2 stents placed patient high risk for NSAIDs need to discontinue immediately  - Right knee pain and swelling mild arthritis patient need to obtain x-ray today  Starting prednisone 20 mg daily  - Obstructive sleep apnea compensated continue CPAP  -COPD uncontrolled did not improve with Trelegy switch patient to the dose again  -Coronary artery disease no chest pain no shortness of breath maximize medical management continue with aspirin daily  -Hypertension controlled  -Hypercholesterolemia improved continue low-fat diet continue with low-fat diet  - Hypothyroid compensated continue levothyroxine  - Hypokalemia continue with potassium 10 mill equivalent daily continues high potassium diet, controlled  -Diabetes controlled hemoglobin A1c 6 random sugar controlled continue low-carb diet improving continue with current medication  Follow-up in 1 months

## 2023-10-17 ENCOUNTER — PHARMACY VISIT (OUTPATIENT)
Dept: PHARMACY | Facility: CLINIC | Age: 58
End: 2023-10-17
Payer: COMMERCIAL

## 2023-10-17 DIAGNOSIS — E03.9 ACQUIRED HYPOTHYROIDISM: Chronic | ICD-10-CM

## 2023-10-17 DIAGNOSIS — F41.8 DEPRESSION WITH ANXIETY: Chronic | ICD-10-CM

## 2023-10-17 DIAGNOSIS — I10 HYPERTENSION, UNCONTROLLED: ICD-10-CM

## 2023-10-17 DIAGNOSIS — N52.9 ERECTILE DYSFUNCTION, UNSPECIFIED ERECTILE DYSFUNCTION TYPE: Chronic | ICD-10-CM

## 2023-10-17 PROCEDURE — RXMED WILLOW AMBULATORY MEDICATION CHARGE

## 2023-10-18 ENCOUNTER — PHARMACY VISIT (OUTPATIENT)
Dept: PHARMACY | Facility: CLINIC | Age: 58
End: 2023-10-18
Payer: COMMERCIAL

## 2023-10-18 PROCEDURE — RXMED WILLOW AMBULATORY MEDICATION CHARGE

## 2023-10-18 RX ORDER — LOSARTAN POTASSIUM 100 MG/1
100 TABLET ORAL DAILY
Qty: 90 TABLET | Refills: 1 | Status: SHIPPED | OUTPATIENT
Start: 2023-10-18 | End: 2024-04-04 | Stop reason: SDUPTHER

## 2023-10-18 RX ORDER — CARVEDILOL 12.5 MG/1
TABLET ORAL
Qty: 45 TABLET | Refills: 1 | Status: SHIPPED | OUTPATIENT
Start: 2023-10-18 | End: 2024-04-04 | Stop reason: SDUPTHER

## 2023-10-18 RX ORDER — BUPROPION HYDROCHLORIDE 150 MG/1
150 TABLET ORAL
Qty: 90 TABLET | Refills: 1 | Status: SHIPPED | OUTPATIENT
Start: 2023-10-18 | End: 2024-04-04 | Stop reason: SDUPTHER

## 2023-10-18 RX ORDER — LEVOTHYROXINE SODIUM 50 UG/1
75 TABLET ORAL
Qty: 135 TABLET | Refills: 1 | Status: SHIPPED | OUTPATIENT
Start: 2023-10-18 | End: 2024-04-04 | Stop reason: SDUPTHER

## 2023-10-18 RX ORDER — SILDENAFIL 100 MG/1
100 TABLET, FILM COATED ORAL AS NEEDED
Qty: 10 TABLET | Refills: 2 | Status: SHIPPED | OUTPATIENT
Start: 2023-10-18

## 2023-11-02 ENCOUNTER — APPOINTMENT (OUTPATIENT)
Dept: PRIMARY CARE | Facility: CLINIC | Age: 58
End: 2023-11-02
Payer: COMMERCIAL

## 2023-11-13 ENCOUNTER — PHARMACY VISIT (OUTPATIENT)
Dept: PHARMACY | Facility: CLINIC | Age: 58
End: 2023-11-13
Payer: COMMERCIAL

## 2023-11-14 ENCOUNTER — PHARMACY VISIT (OUTPATIENT)
Dept: PHARMACY | Facility: CLINIC | Age: 58
End: 2023-11-14
Payer: COMMERCIAL

## 2023-12-14 ENCOUNTER — OFFICE VISIT (OUTPATIENT)
Dept: PRIMARY CARE | Facility: CLINIC | Age: 58
End: 2023-12-14
Payer: COMMERCIAL

## 2023-12-14 VITALS
HEART RATE: 85 BPM | BODY MASS INDEX: 46.22 KG/M2 | OXYGEN SATURATION: 96 % | TEMPERATURE: 97 F | SYSTOLIC BLOOD PRESSURE: 135 MMHG | WEIGHT: 315 LBS | DIASTOLIC BLOOD PRESSURE: 85 MMHG

## 2023-12-14 DIAGNOSIS — F17.200 SMOKING: ICD-10-CM

## 2023-12-14 DIAGNOSIS — E11.69 DIABETES MELLITUS TYPE 2 IN OBESE: Primary | Chronic | ICD-10-CM

## 2023-12-14 DIAGNOSIS — I25.10 CORONARY ARTERY DISEASE INVOLVING NATIVE CORONARY ARTERY OF NATIVE HEART, UNSPECIFIED WHETHER ANGINA PRESENT: ICD-10-CM

## 2023-12-14 DIAGNOSIS — E66.01 MORBID OBESITY WITH BMI OF 45.0-49.9, ADULT (MULTI): ICD-10-CM

## 2023-12-14 DIAGNOSIS — E66.9 DIABETES MELLITUS TYPE 2 IN OBESE: Primary | Chronic | ICD-10-CM

## 2023-12-14 DIAGNOSIS — J44.9 CHRONIC OBSTRUCTIVE PULMONARY DISEASE, UNSPECIFIED COPD TYPE (MULTI): ICD-10-CM

## 2023-12-14 DIAGNOSIS — E03.9 ACQUIRED HYPOTHYROIDISM: ICD-10-CM

## 2023-12-14 DIAGNOSIS — Z00.00 ANNUAL PHYSICAL EXAM: ICD-10-CM

## 2023-12-14 DIAGNOSIS — I10 HYPERTENSION, UNCONTROLLED: ICD-10-CM

## 2023-12-14 LAB
POC FINGERSTICK BLOOD GLUCOSE: 129 MG/DL (ref 70–100)
POC HEMOGLOBIN A1C: 6.4 % (ref 4.2–6.5)

## 2023-12-14 PROCEDURE — 4010F ACE/ARB THERAPY RXD/TAKEN: CPT | Performed by: INTERNAL MEDICINE

## 2023-12-14 PROCEDURE — 1036F TOBACCO NON-USER: CPT | Performed by: INTERNAL MEDICINE

## 2023-12-14 PROCEDURE — 3079F DIAST BP 80-89 MM HG: CPT | Performed by: INTERNAL MEDICINE

## 2023-12-14 PROCEDURE — 3075F SYST BP GE 130 - 139MM HG: CPT | Performed by: INTERNAL MEDICINE

## 2023-12-14 PROCEDURE — 82962 GLUCOSE BLOOD TEST: CPT | Performed by: INTERNAL MEDICINE

## 2023-12-14 PROCEDURE — 3008F BODY MASS INDEX DOCD: CPT | Performed by: INTERNAL MEDICINE

## 2023-12-14 PROCEDURE — 83036 HEMOGLOBIN GLYCOSYLATED A1C: CPT | Performed by: INTERNAL MEDICINE

## 2023-12-14 PROCEDURE — 99214 OFFICE O/P EST MOD 30 MIN: CPT | Performed by: INTERNAL MEDICINE

## 2023-12-14 PROCEDURE — RXMED WILLOW AMBULATORY MEDICATION CHARGE

## 2023-12-14 ASSESSMENT — ENCOUNTER SYMPTOMS
WHEEZING: 0
COUGH: 0
PALPITATIONS: 0
DIZZINESS: 0
DIFFICULTY URINATING: 0
FATIGUE: 0
ABDOMINAL PAIN: 0
FEVER: 0
BRUISES/BLEEDS EASILY: 0
SINUS PAIN: 0
DIARRHEA: 0
SORE THROAT: 0
BLOOD IN STOOL: 0
UNEXPECTED WEIGHT CHANGE: 0
ARTHRALGIAS: 0
HEADACHES: 0

## 2023-12-14 NOTE — PROGRESS NOTES
Subjective   Patient ID: Lyle Oh is a 58 y.o. male who presents for Diabetes (A1C, random).    -Diabetes improving hemoglobin A1c 6.4 continue low-carb diet exercise did not tolerate Jardiance due to diarrhea patient not taking it at this time only on Trulicity  Maximize Trulicity 3 mg weekly counseled about low-carb diet exercise  -Right knee pain improved after rest and weight loss continues Tylenol only for breakthrough pain avoid any NSAIDs  -CAD, 2 stents placed patient high risk for NSAIDs need to discontinue immediately compensated no chest pain continues aspirin daily  - Obstructive sleep apnea compensated continue CPAP  -COPD uncontrolled did not improve with Trelegy s now on Tudorza twice a day doing well no complaints  - Nicotine dependency-counseled about smoking cessation  Counseled about low-dose CT scanning patient declined at this time  Screen for colon cancer up-to-date  -Hypertension controlled  -Hypercholesterolemia improved continue low-fat diet continue with low-fat diet follow-up lipid profile  - Hypothyroid compensated continue levothyroxine follow-up thyroid function test  - Hypokalemia continue with potassium 10 mill equivalent daily continues high potassium diet, controlled  - Follow-up with patient in 3 months physical exam and blood work next appointment    Diabetes  Pertinent negatives for hypoglycemia include no dizziness or headaches. Pertinent negatives for diabetes include no chest pain and no fatigue.          Review of Systems   Constitutional:  Negative for fatigue, fever and unexpected weight change.   HENT:  Negative for congestion, ear discharge, ear pain, mouth sores, sinus pain and sore throat.    Eyes:  Negative for visual disturbance.   Respiratory:  Negative for cough and wheezing.    Cardiovascular:  Negative for chest pain, palpitations and leg swelling.   Gastrointestinal:  Negative for abdominal pain, blood in stool and diarrhea.   Genitourinary:  Negative  for difficulty urinating.   Musculoskeletal:  Negative for arthralgias.   Skin:  Negative for rash.   Neurological:  Negative for dizziness and headaches.   Hematological:  Does not bruise/bleed easily.   Psychiatric/Behavioral:  Negative for behavioral problems.    All other systems reviewed and are negative.      Objective   Lab Results   Component Value Date    HGBA1C 6.4 12/14/2023      /85   Pulse 85   Temp 36.1 °C (97 °F)   Wt (!) 150 kg (331 lb 6.4 oz)   SpO2 96%   BMI 46.22 kg/m²   Lab Results   Component Value Date    WBC 9.0 03/10/2023    HGB 13.3 (L) 03/10/2023    HCT 42.1 03/10/2023     03/10/2023    CHOL 198 03/10/2023    TRIG 109 03/10/2023    HDL 37.0 (A) 03/10/2023    ALT 28 03/10/2023    AST 27 03/10/2023     06/14/2023    K 3.8 06/14/2023     (H) 06/14/2023    CREATININE 0.93 06/14/2023    BUN 16 06/14/2023    CO2 24 06/14/2023    TSH 3.46 06/14/2023    INR 0.9 06/17/2019    HGBA1C 6.4 12/14/2023     par   Physical Exam  Vitals and nursing note reviewed.   Constitutional:       Appearance: Normal appearance. He is obese.   HENT:      Head: Normocephalic.      Nose: Nose normal.   Eyes:      Conjunctiva/sclera: Conjunctivae normal.      Pupils: Pupils are equal, round, and reactive to light.   Cardiovascular:      Rate and Rhythm: Regular rhythm.   Pulmonary:      Effort: Pulmonary effort is normal.      Breath sounds: Normal breath sounds.   Abdominal:      General: Abdomen is flat.      Palpations: Abdomen is soft.   Musculoskeletal:      Cervical back: Neck supple.   Skin:     General: Skin is warm.   Neurological:      General: No focal deficit present.      Mental Status: He is oriented to person, place, and time.   Psychiatric:         Mood and Affect: Mood normal.         Assessment/Plan   Lyle was seen today for diabetes.  Diagnoses and all orders for this visit:  Diabetes mellitus type 2 in obese (CMS/[facility]) (Primary)  -     POCT fingerstick glucose manually  resulted  -     POCT glycosylated hemoglobin (Hb A1C) manually resulted  -     dulaglutide 3 mg/0.5 mL pen injector; Inject 3 mg under the skin 1 (one) time per week.  -     Albumin , Urine Random; Future  Coronary artery disease involving native coronary artery of native heart, unspecified whether angina present  Chronic obstructive pulmonary disease, unspecified COPD type (CMS/[facility])  Hypertension, uncontrolled  Morbid obesity with BMI of 45.0-49.9, adult (CMS/[facility])  Acquired hypothyroidism  Annual physical exam  -     CBC and Auto Differential; Future  -     Comprehensive Metabolic Panel; Future  -     Lipid Panel; Future  -     Prostate Specific Antigen; Future  -     TSH with reflex to Free T4 if abnormal; Future  Other orders  -     Follow Up In Primary Care  -     Follow Up In Primary Care - Health Maintenance; Future   -Diabetes improving hemoglobin A1c 6.4 continue low-carb diet exercise did not tolerate Jardiance due to diarrhea patient not taking it at this time only on Trulicity  Maximize Trulicity 3 mg weekly counseled about low-carb diet exercise  -Right knee pain improved after rest and weight loss continues Tylenol only for breakthrough pain avoid any NSAIDs  -CAD, 2 stents placed patient high risk for NSAIDs need to discontinue immediately compensated no chest pain continues aspirin daily  - Obstructive sleep apnea compensated continue CPAP  -COPD uncontrolled did not improve with Trelegy s now on Tudorza twice a day doing well no complaints  - Nicotine dependency-counseled about smoking cessation  Counseled about low-dose CT scanning patient declined at this time  Screen for colon cancer up-to-date  -Hypertension controlled  -Hypercholesterolemia improved continue low-fat diet continue with low-fat diet follow-up lipid profile  - Hypothyroid compensated continue levothyroxine follow-up thyroid function test  - Hypokalemia continue with potassium 10 mill equivalent daily continues high potassium diet,  controlled  - Follow-up with patient in 3 months physical exam and blood work next appointment

## 2023-12-18 ENCOUNTER — PHARMACY VISIT (OUTPATIENT)
Dept: PHARMACY | Facility: CLINIC | Age: 58
End: 2023-12-18
Payer: COMMERCIAL

## 2024-01-03 PROCEDURE — RXMED WILLOW AMBULATORY MEDICATION CHARGE

## 2024-01-05 ENCOUNTER — PHARMACY VISIT (OUTPATIENT)
Dept: PHARMACY | Facility: CLINIC | Age: 59
End: 2024-01-05
Payer: COMMERCIAL

## 2024-01-05 PROCEDURE — RXMED WILLOW AMBULATORY MEDICATION CHARGE

## 2024-02-05 ENCOUNTER — PHARMACY VISIT (OUTPATIENT)
Dept: PHARMACY | Facility: CLINIC | Age: 59
End: 2024-02-05
Payer: COMMERCIAL

## 2024-02-05 DIAGNOSIS — J44.1 COPD EXACERBATION (MULTI): Chronic | ICD-10-CM

## 2024-02-05 PROCEDURE — RXMED WILLOW AMBULATORY MEDICATION CHARGE

## 2024-02-05 RX ORDER — METHYLPREDNISOLONE 4 MG/1
TABLET ORAL
Qty: 21 TABLET | Refills: 0 | Status: SHIPPED | OUTPATIENT
Start: 2024-02-05 | End: 2024-02-20 | Stop reason: ALTCHOICE

## 2024-02-05 RX ORDER — METHYLPREDNISOLONE 4 MG/1
4 TABLET ORAL
COMMUNITY
End: 2024-02-05 | Stop reason: SDUPTHER

## 2024-02-19 NOTE — PROGRESS NOTES
[facility] ([facility])    Lyle Oh is a 58 y.o. male was referred to Clinical Pharmacy Team to complete a comprehensive medication review (CMR) with a pharmacist as part of the [employer] diabetes program.      Referring Provider: Kiet Rios MD    Subjective   No Known Allergies    [facility]  [address] [place] [id number]  Phone: [phone] Fax: [phone]      Patient is a 58 y.o. male who presents for a clinical pharmacy visit for [facility] re-enrollment. Updated medication and allergy list appropriately. Patient does not monitor his blood glucose.       Objective     There were no vitals taken for this visit.     LAB  Lab Results   Component Value Date    BILITOT 0.6 03/10/2023    CALCIUM 9.1 06/14/2023    CO2 24 06/14/2023     (H) 06/14/2023    CREATININE 0.93 06/14/2023    GLUCOSE 131 (H) 06/14/2023    ALKPHOS 56 03/10/2023    K 3.8 06/14/2023    PROT 6.8 03/10/2023     06/14/2023    AST 27 03/10/2023    ALT 28 03/10/2023    BUN 16 06/14/2023    ANIONGAP 11 06/14/2023    MG 2.10 03/10/2023    ALBUMIN 4.2 03/10/2023    GFRMALE >90 06/14/2023     Lab Results   Component Value Date    TRIG 109 03/10/2023    CHOL 198 03/10/2023    HDL 37.0 (A) 03/10/2023     Lab Results   Component Value Date    HGBA1C 6.4 12/14/2023       Current Outpatient Medications on File Prior to Visit   Medication Sig Dispense Refill    aclidinium (Tudorza Pressair) 400 mcg/actuation inhaler Inhale 1 puff  by mouth 2 times a day. 1 each 2    aspirin 81 mg EC tablet Take 1 tablet (81 mg) by mouth once daily.      buPROPion XL (Wellbutrin XL) 150 mg 24 hr tablet TAKE 1 TABLET BY MOUTH EVERY MORNING 90 tablet 1    carvedilol (Coreg) 12.5 mg tablet TAKE 1/2 TABLET BY MOUTH EVERY MORNING ONLY 45 tablet 1    cholecalciferol (Vitamin D-3) 5,000 Units tablet Take 1 tablet (5,000 Units) by mouth once every day.      dulaglutide 3 mg/0.5 mL pen  injector Inject 3 mg under the skin 1 (one) time per week. 6 mL 1    levothyroxine (Synthroid, Levoxyl) 50 mcg tablet Take 1.5 tablets (75mcg) by mouth once daily in the morning. Take before meals. 135 tablet 1    losartan (Cozaar) 100 mg tablet TAKE 1 TABLET BY MOUTH ONCE DAILY 90 tablet 1    methylPREDNISolone (Medrol Dospak) 4 mg tablets Follow schedule on package instructions 21 tablet 0    nitroglycerin (Nitrostat) 0.4 mg SL tablet Place under the tongue.      potassium chloride CR (Klor-Con) 10 mEq ER tablet TAKE 1 TABLET (10 MEQ) BY MOUTH ONCE DAILY. DO NOT CRUSH, CHEW, OR SPLIT. 30 tablet 11    rosuvastatin (Crestor) 40 mg tablet Take 1 tablet (40 mg) by mouth once daily.      sildenafil (Viagra) 100 mg tablet Take 1 tablet (100 mg) by mouth if needed for erectile dysfunction. 1 hour before needed 10 tablet 2    topiramate (Topamax) 25 mg tablet Take 2 tablets (50 mg) by mouth 2 times a day.      Ventolin HFA 90 mcg/actuation inhaler INHALE 2 PUFFS EVERY 4 HOURS IF NEEDED FOR WHEEZING. 4-6 HOURS AS NEEDED 54 g 1    vit B/folic/choline/inos/herbs (ULTRA B-100 COMPLEX, FOODBASE, ORAL) Take 1 tablet by mouth 1 (one) time each day. As directed       No current facility-administered medications on file prior to visit.        HISTORICAL PHARMACOTHERAPY (MED+REASON FOR DC)  -Metformin: diarrhea  -Synjardy: diarrhea  -Jardiance: diarrhea    SECONDARY PREVENTION  - Statin? yes; Rosuvastatin 40 mg  - ACE-I/ARB? yes; Losartan 100 mg     DRUG INTERACTIONS  - Nitroglycerin and sildenafil: Patient reported he has never needed to use the nitroglycerin    ASSESSMENT/PLAN:   Employee Health Diabetes Program (VBID)  - Patient enrolled in  Employee diabetes program for $0 co-pays on diabetes medications/supplies. Enrollment should be active in 2-4 weeks.  - Requested VBID enrollment date: 2/20/2024  - PharmD Management Level: 1    Assessment/Plan   Problem List Items Addressed This Visit       Diabetes mellitus type 2 in  obese (CMS/HCC) - Primary (Chronic)      - Patient's diabetes is controlled with an A1c of 6.4% (12/14/2023) (goal <7%)  CONTINUE Trulicity 3 mg once weekly  CONTINUE healthy diet and lifestyle  CONTINUE to follow up with PCP as directed          Follow up with Clinical Pharmacy Team 1 year for VBID re-enrollment tentatively scheduled for 2/19/2025 at 12pm     Continue all meds under the continuation of care with the referring provider and clinical pharmacy team.    Please reach out to the Clinical Pharmacy Team if there are any further questions.     Verbal consent to manage patient's drug therapy was obtained from patient. They were informed they may decline to participate or withdraw from participation in pharmacy services at any time.    Marybel Sanchez, PharmD  PGY-1 Pharmacy Resident  [phone]

## 2024-02-20 ENCOUNTER — TELEMEDICINE (OUTPATIENT)
Dept: PHARMACY | Facility: HOSPITAL | Age: 59
End: 2024-02-20
Payer: COMMERCIAL

## 2024-02-20 DIAGNOSIS — E66.9 DIABETES MELLITUS TYPE 2 IN OBESE: Primary | Chronic | ICD-10-CM

## 2024-02-20 DIAGNOSIS — E11.69 DIABETES MELLITUS TYPE 2 IN OBESE: Primary | Chronic | ICD-10-CM

## 2024-02-20 NOTE — ASSESSMENT & PLAN NOTE
- Patient's diabetes is controlled with an A1c of 6.4% (12/14/2023) (goal <7%)  CONTINUE Trulicity 3 mg once weekly  CONTINUE healthy diet and lifestyle  CONTINUE to follow up with PCP as directed

## 2024-03-07 DIAGNOSIS — J44.1 CHRONIC OBSTRUCTIVE PULMONARY DISEASE WITH ACUTE EXACERBATION (MULTI): Chronic | ICD-10-CM

## 2024-03-07 RX ORDER — ACLIDINIUM BROMIDE 400 UG/1
1 POWDER, METERED RESPIRATORY (INHALATION)
Qty: 1 EACH | Refills: 2 | Status: SHIPPED | OUTPATIENT
Start: 2024-03-07 | End: 2024-05-31 | Stop reason: SDUPTHER

## 2024-03-18 DIAGNOSIS — J44.9 CHRONIC OBSTRUCTIVE PULMONARY DISEASE, UNSPECIFIED COPD TYPE (MULTI): Chronic | ICD-10-CM

## 2024-03-18 DIAGNOSIS — E66.9 DIABETES MELLITUS TYPE 2 IN OBESE: Chronic | ICD-10-CM

## 2024-03-18 DIAGNOSIS — E11.69 DIABETES MELLITUS TYPE 2 IN OBESE: Chronic | ICD-10-CM

## 2024-03-18 RX ORDER — ALBUTEROL SULFATE 90 UG/1
AEROSOL, METERED RESPIRATORY (INHALATION)
Qty: 54 G | Refills: 1 | Status: SHIPPED | OUTPATIENT
Start: 2024-03-18 | End: 2025-03-18

## 2024-03-19 ENCOUNTER — PHARMACY VISIT (OUTPATIENT)
Dept: PHARMACY | Facility: CLINIC | Age: 59
End: 2024-03-19
Payer: COMMERCIAL

## 2024-03-19 PROCEDURE — RXMED WILLOW AMBULATORY MEDICATION CHARGE

## 2024-04-03 ENCOUNTER — LAB (OUTPATIENT)
Dept: LAB | Facility: LAB | Age: 59
End: 2024-04-03
Payer: COMMERCIAL

## 2024-04-03 DIAGNOSIS — E66.9 TYPE 2 DIABETES MELLITUS WITH OBESITY (MULTI): ICD-10-CM

## 2024-04-03 DIAGNOSIS — Z00.00 ANNUAL PHYSICAL EXAM: ICD-10-CM

## 2024-04-03 DIAGNOSIS — E11.69 TYPE 2 DIABETES MELLITUS WITH OBESITY (MULTI): ICD-10-CM

## 2024-04-03 LAB
ALBUMIN SERPL BCP-MCNC: 4.2 G/DL (ref 3.4–5)
ALP SERPL-CCNC: 78 U/L (ref 33–120)
ALT SERPL W P-5'-P-CCNC: 30 U/L (ref 10–52)
ANION GAP SERPL CALC-SCNC: 12 MMOL/L (ref 10–20)
AST SERPL W P-5'-P-CCNC: 18 U/L (ref 9–39)
BASOPHILS # BLD AUTO: 0.08 X10*3/UL (ref 0–0.1)
BASOPHILS NFR BLD AUTO: 0.7 %
BILIRUB SERPL-MCNC: 0.4 MG/DL (ref 0–1.2)
BUN SERPL-MCNC: 12 MG/DL (ref 6–23)
CALCIUM SERPL-MCNC: 9.8 MG/DL (ref 8.6–10.3)
CHLORIDE SERPL-SCNC: 102 MMOL/L (ref 98–107)
CHOLEST SERPL-MCNC: 263 MG/DL (ref 0–199)
CHOLESTEROL/HDL RATIO: 6.8
CO2 SERPL-SCNC: 28 MMOL/L (ref 21–32)
CREAT SERPL-MCNC: 1.04 MG/DL (ref 0.5–1.3)
EGFRCR SERPLBLD CKD-EPI 2021: 84 ML/MIN/1.73M*2
EOSINOPHIL # BLD AUTO: 0.25 X10*3/UL (ref 0–0.7)
EOSINOPHIL NFR BLD AUTO: 2.3 %
ERYTHROCYTE [DISTWIDTH] IN BLOOD BY AUTOMATED COUNT: 13.5 % (ref 11.5–14.5)
GLUCOSE SERPL-MCNC: 161 MG/DL (ref 74–99)
HCT VFR BLD AUTO: 44.8 % (ref 41–52)
HDLC SERPL-MCNC: 38.8 MG/DL
HGB BLD-MCNC: 14.5 G/DL (ref 13.5–17.5)
IMM GRANULOCYTES # BLD AUTO: 0.04 X10*3/UL (ref 0–0.7)
IMM GRANULOCYTES NFR BLD AUTO: 0.4 % (ref 0–0.9)
LDLC SERPL CALC-MCNC: 183 MG/DL
LYMPHOCYTES # BLD AUTO: 3.38 X10*3/UL (ref 1.2–4.8)
LYMPHOCYTES NFR BLD AUTO: 30.6 %
MCH RBC QN AUTO: 29.3 PG (ref 26–34)
MCHC RBC AUTO-ENTMCNC: 32.4 G/DL (ref 32–36)
MCV RBC AUTO: 91 FL (ref 80–100)
MONOCYTES # BLD AUTO: 0.43 X10*3/UL (ref 0.1–1)
MONOCYTES NFR BLD AUTO: 3.9 %
NEUTROPHILS # BLD AUTO: 6.85 X10*3/UL (ref 1.2–7.7)
NEUTROPHILS NFR BLD AUTO: 62.1 %
NON HDL CHOLESTEROL: 224 MG/DL (ref 0–149)
NRBC BLD-RTO: 0 /100 WBCS (ref 0–0)
PLATELET # BLD AUTO: 324 X10*3/UL (ref 150–450)
POTASSIUM SERPL-SCNC: 3.6 MMOL/L (ref 3.5–5.3)
PROT SERPL-MCNC: 7.6 G/DL (ref 6.4–8.2)
PSA SERPL-MCNC: 0.75 NG/ML
RBC # BLD AUTO: 4.95 X10*6/UL (ref 4.5–5.9)
SODIUM SERPL-SCNC: 138 MMOL/L (ref 136–145)
TRIGL SERPL-MCNC: 204 MG/DL (ref 0–149)
TSH SERPL-ACNC: 1.68 MIU/L (ref 0.44–3.98)
VLDL: 41 MG/DL (ref 0–40)
WBC # BLD AUTO: 11 X10*3/UL (ref 4.4–11.3)

## 2024-04-03 PROCEDURE — 36415 COLL VENOUS BLD VENIPUNCTURE: CPT

## 2024-04-03 PROCEDURE — 82043 UR ALBUMIN QUANTITATIVE: CPT

## 2024-04-03 PROCEDURE — 82570 ASSAY OF URINE CREATININE: CPT

## 2024-04-03 PROCEDURE — 84153 ASSAY OF PSA TOTAL: CPT

## 2024-04-04 ENCOUNTER — OFFICE VISIT (OUTPATIENT)
Dept: PRIMARY CARE | Facility: CLINIC | Age: 59
End: 2024-04-04
Payer: COMMERCIAL

## 2024-04-04 VITALS
HEART RATE: 94 BPM | OXYGEN SATURATION: 96 % | HEIGHT: 70 IN | SYSTOLIC BLOOD PRESSURE: 132 MMHG | BODY MASS INDEX: 45.1 KG/M2 | WEIGHT: 315 LBS | DIASTOLIC BLOOD PRESSURE: 78 MMHG | TEMPERATURE: 97.3 F

## 2024-04-04 DIAGNOSIS — E66.9 TYPE 2 DIABETES MELLITUS WITH OBESITY (MULTI): ICD-10-CM

## 2024-04-04 DIAGNOSIS — E11.9 TYPE 2 DIABETES MELLITUS WITHOUT COMPLICATION, UNSPECIFIED WHETHER LONG TERM INSULIN USE (MULTI): ICD-10-CM

## 2024-04-04 DIAGNOSIS — Z78.9 STATIN INTOLERANCE: ICD-10-CM

## 2024-04-04 DIAGNOSIS — E66.01 MORBID OBESITY WITH BMI OF 45.0-49.9, ADULT (MULTI): ICD-10-CM

## 2024-04-04 DIAGNOSIS — E03.9 ACQUIRED HYPOTHYROIDISM: Chronic | ICD-10-CM

## 2024-04-04 DIAGNOSIS — F41.8 DEPRESSION WITH ANXIETY: Chronic | ICD-10-CM

## 2024-04-04 DIAGNOSIS — I25.10 CORONARY ARTERY DISEASE INVOLVING NATIVE CORONARY ARTERY OF NATIVE HEART, UNSPECIFIED WHETHER ANGINA PRESENT: ICD-10-CM

## 2024-04-04 DIAGNOSIS — J44.9 CHRONIC OBSTRUCTIVE PULMONARY DISEASE, UNSPECIFIED COPD TYPE (MULTI): ICD-10-CM

## 2024-04-04 DIAGNOSIS — I10 HYPERTENSION, UNCONTROLLED: ICD-10-CM

## 2024-04-04 DIAGNOSIS — E78.49 OTHER HYPERLIPIDEMIA: ICD-10-CM

## 2024-04-04 DIAGNOSIS — F17.200 SMOKING: ICD-10-CM

## 2024-04-04 DIAGNOSIS — Z00.00 ANNUAL PHYSICAL EXAM: Primary | ICD-10-CM

## 2024-04-04 DIAGNOSIS — E11.69 TYPE 2 DIABETES MELLITUS WITH OBESITY (MULTI): ICD-10-CM

## 2024-04-04 LAB
CREAT UR-MCNC: 119.7 MG/DL (ref 20–370)
MICROALBUMIN UR-MCNC: 12.3 MG/L
MICROALBUMIN/CREAT UR: 10.3 UG/MG CREAT
POC FINGERSTICK BLOOD GLUCOSE: 171 MG/DL (ref 70–100)
POC HEMOGLOBIN A1C: 7.7 % (ref 4.2–6.5)

## 2024-04-04 PROCEDURE — 1036F TOBACCO NON-USER: CPT | Performed by: INTERNAL MEDICINE

## 2024-04-04 PROCEDURE — 82962 GLUCOSE BLOOD TEST: CPT | Performed by: INTERNAL MEDICINE

## 2024-04-04 PROCEDURE — 99214 OFFICE O/P EST MOD 30 MIN: CPT | Performed by: INTERNAL MEDICINE

## 2024-04-04 PROCEDURE — 3008F BODY MASS INDEX DOCD: CPT | Performed by: INTERNAL MEDICINE

## 2024-04-04 PROCEDURE — 3061F NEG MICROALBUMINURIA REV: CPT | Performed by: INTERNAL MEDICINE

## 2024-04-04 PROCEDURE — RXMED WILLOW AMBULATORY MEDICATION CHARGE

## 2024-04-04 PROCEDURE — 3078F DIAST BP <80 MM HG: CPT | Performed by: INTERNAL MEDICINE

## 2024-04-04 PROCEDURE — 99396 PREV VISIT EST AGE 40-64: CPT | Performed by: INTERNAL MEDICINE

## 2024-04-04 PROCEDURE — 4010F ACE/ARB THERAPY RXD/TAKEN: CPT | Performed by: INTERNAL MEDICINE

## 2024-04-04 PROCEDURE — 83036 HEMOGLOBIN GLYCOSYLATED A1C: CPT | Performed by: INTERNAL MEDICINE

## 2024-04-04 PROCEDURE — 3050F LDL-C >= 130 MG/DL: CPT | Performed by: INTERNAL MEDICINE

## 2024-04-04 PROCEDURE — 3075F SYST BP GE 130 - 139MM HG: CPT | Performed by: INTERNAL MEDICINE

## 2024-04-04 RX ORDER — LEVOTHYROXINE SODIUM 50 UG/1
75 TABLET ORAL
Qty: 135 TABLET | Refills: 1 | Status: SHIPPED | OUTPATIENT
Start: 2024-04-04

## 2024-04-04 RX ORDER — LOSARTAN POTASSIUM 100 MG/1
100 TABLET ORAL DAILY
Qty: 90 TABLET | Refills: 1 | Status: SHIPPED | OUTPATIENT
Start: 2024-04-04 | End: 2024-04-23 | Stop reason: HOSPADM

## 2024-04-04 RX ORDER — EVOLOCUMAB 140 MG/ML
140 INJECTION, SOLUTION SUBCUTANEOUS
Qty: 2 ML | Refills: 11 | Status: SHIPPED | OUTPATIENT
Start: 2024-04-04 | End: 2025-04-04

## 2024-04-04 RX ORDER — BUPROPION HYDROCHLORIDE 150 MG/1
150 TABLET ORAL
Qty: 90 TABLET | Refills: 1 | Status: SHIPPED | OUTPATIENT
Start: 2024-04-04 | End: 2025-04-04

## 2024-04-04 RX ORDER — CARVEDILOL 12.5 MG/1
TABLET ORAL
Qty: 45 TABLET | Refills: 1 | Status: SHIPPED | OUTPATIENT
Start: 2024-04-04 | End: 2024-04-23 | Stop reason: HOSPADM

## 2024-04-04 ASSESSMENT — ANXIETY QUESTIONNAIRES
1. FEELING NERVOUS, ANXIOUS, OR ON EDGE: NOT AT ALL
IF YOU CHECKED OFF ANY PROBLEMS ON THIS QUESTIONNAIRE, HOW DIFFICULT HAVE THESE PROBLEMS MADE IT FOR YOU TO DO YOUR WORK, TAKE CARE OF THINGS AT HOME, OR GET ALONG WITH OTHER PEOPLE: NOT DIFFICULT AT ALL
2. NOT BEING ABLE TO STOP OR CONTROL WORRYING: NOT AT ALL
4. TROUBLE RELAXING: NOT AT ALL
7. FEELING AFRAID AS IF SOMETHING AWFUL MIGHT HAPPEN: NOT AT ALL
6. BECOMING EASILY ANNOYED OR IRRITABLE: SEVERAL DAYS
3. WORRYING TOO MUCH ABOUT DIFFERENT THINGS: NOT AT ALL

## 2024-04-04 ASSESSMENT — ENCOUNTER SYMPTOMS
FEVER: 0
HEADACHES: 0
FATIGUE: 0
UNEXPECTED WEIGHT CHANGE: 0
DIFFICULTY URINATING: 0
DIARRHEA: 0
ABDOMINAL PAIN: 0
WHEEZING: 0
ARTHRALGIAS: 0
SORE THROAT: 0
PALPITATIONS: 0
DIZZINESS: 0
SINUS PAIN: 0
BLOOD IN STOOL: 0
BRUISES/BLEEDS EASILY: 0
COUGH: 0

## 2024-04-04 ASSESSMENT — PATIENT HEALTH QUESTIONNAIRE - PHQ9
2. FEELING DOWN, DEPRESSED OR HOPELESS: NOT AT ALL
1. LITTLE INTEREST OR PLEASURE IN DOING THINGS: NOT AT ALL
SUM OF ALL RESPONSES TO PHQ9 QUESTIONS 1 AND 2: 0

## 2024-04-04 NOTE — PROGRESS NOTES
Subjective   Patient ID: Lyle Oh is a 58 y.o. male who presents for Annual Exam, Diabetes (A1C, random), and Results (BW).    Annual preventive visit  - Vaccinations patient declined all vaccines aware of risk and benefit  - Screen for colon cancer up-to-date  - Morbid obesity  BMI 47.6 patient counseled about weight loss low-carb diet  I spent >15minutes minutes face to face with individial providing recommendations for nutrition choices and exercise plan to help achieve weight reduction.  -Screen for depression negative  -    Follow-up  - Diabetes worsening hemoglobin A1c 7.7 patient counseled about compliance with diet weight loss  Unable to do Jardiance unable to use metformin patient takes now Trulicity without improvement need to switch to Ozempic 2 mg weekly reevaluate patient in 4 weeks  - Hypercholesterolemia patient discontinued Crestor with improvement in his joint pains patient counseled about risk of discontinue Crestor for almost a year now risk for stroke and heart attack patient will be started on Repatha every 2 weeks and then reintroducing statin a smaller dose patient aware of risk and benefit need to continue diet controlled  --Right knee pain improved after rest and weight loss continues Tylenol only for breakthrough pain avoid any NSAIDs  -CAD, 2 stents placed patient high risk for NSAIDs need to discontinue immediately compensated no chest pain continues aspirin daily  Refer patient to cardiology  - Obstructive sleep apnea compensated continue CPAP  -COPD uncontrolled did not improve with Trelegy s now on Tudorza twice a day doing well no complaints doing better  - Nicotine dependency-counseled about smoking cessation  Counseled about low-dose CT scanning patient declined at this time  -Hypertension controlled  -Hypercholesterolemia improved continue low-fat diet continue with low-fat diet follow-up lipid profile  - Hypothyroid compensated continue levothyroxine   - Hypokalemia  "continue with potassium 10 mill equivalent daily continues high potassium diet, controlled  - Follow-up with patient for close monitoring patient to call immediately for any chest pain or shortness of breath follow-up results closely      Diabetes  Pertinent negatives for hypoglycemia include no dizziness or headaches. Pertinent negatives for diabetes include no chest pain and no fatigue.          Review of Systems   Constitutional:  Negative for fatigue, fever and unexpected weight change.   HENT:  Negative for congestion, ear discharge, ear pain, mouth sores, sinus pain and sore throat.    Eyes:  Negative for visual disturbance.   Respiratory:  Negative for cough and wheezing.    Cardiovascular:  Negative for chest pain, palpitations and leg swelling.   Gastrointestinal:  Negative for abdominal pain, blood in stool and diarrhea.   Genitourinary:  Negative for difficulty urinating.   Musculoskeletal:  Negative for arthralgias.   Skin:  Negative for rash.   Neurological:  Negative for dizziness and headaches.   Hematological:  Does not bruise/bleed easily.   Psychiatric/Behavioral:  Negative for behavioral problems.    All other systems reviewed and are negative.      Objective   Lab Results   Component Value Date    HGBA1C 7.7 (A) 04/04/2024      /78   Pulse 94   Temp 36.3 °C (97.3 °F)   Ht 1.778 m (5' 10\")   Wt (!) 150 kg (330 lb 12.8 oz)   SpO2 96%   BMI 47.46 kg/m²     Physical Exam  Vitals and nursing note reviewed.   Constitutional:       Appearance: Normal appearance. He is obese.   HENT:      Head: Normocephalic.      Nose: Nose normal.   Eyes:      Conjunctiva/sclera: Conjunctivae normal.      Pupils: Pupils are equal, round, and reactive to light.   Cardiovascular:      Rate and Rhythm: Regular rhythm.   Pulmonary:      Effort: Pulmonary effort is normal.      Breath sounds: Normal breath sounds.   Abdominal:      General: Abdomen is flat.      Palpations: Abdomen is soft.   Musculoskeletal:    "   Cervical back: Neck supple.   Skin:     General: Skin is warm.   Neurological:      General: No focal deficit present.      Mental Status: He is oriented to person, place, and time.   Psychiatric:         Mood and Affect: Mood normal.         Assessment/Plan   Lyle was seen today for annual exam, diabetes and results.  Diagnoses and all orders for this visit:  Annual physical exam (Primary)  Type 2 diabetes mellitus without complication, unspecified whether long term insulin use (CMS/HCC)  -     POCT fingerstick glucose manually resulted  -     POCT glycosylated hemoglobin (Hb A1C) manually resulted  -     evolocumab (Repatha SureClick) 140 mg/mL injection; Inject 1 mL (140 mg) under the skin every 14 (fourteen) days.  Depression with anxiety  -     buPROPion XL (Wellbutrin XL) 150 mg 24 hr tablet; TAKE 1 TABLET BY MOUTH EVERY MORNING  Hypertension, uncontrolled  -     carvedilol (Coreg) 12.5 mg tablet; TAKE 1/2 TABLET BY MOUTH EVERY MORNING ONLY  -     losartan (Cozaar) 100 mg tablet; TAKE 1 TABLET BY MOUTH ONCE DAILY  Acquired hypothyroidism  -     levothyroxine (Synthroid, Levoxyl) 50 mcg tablet; Take 1.5 tablets (75mcg) by mouth once daily in the morning. Take before meals.  Chronic obstructive pulmonary disease, unspecified COPD type (CMS/[facility])  Type 2 diabetes mellitus with obesity (CMS/[facility])  -     semaglutide 2 mg/dose (8 mg/3 mL) pen injector; Inject 2 mg under the skin 1 (one) time per week.  Smoking  Other hyperlipidemia  -     semaglutide 2 mg/dose (8 mg/3 mL) pen injector; Inject 2 mg under the skin 1 (one) time per week.  Coronary artery disease involving native coronary artery of native heart, unspecified whether angina present  -     semaglutide 2 mg/dose (8 mg/3 mL) pen injector; Inject 2 mg under the skin 1 (one) time per week.  -     evolocumab (Repatha SureClick) 140 mg/mL injection; Inject 1 mL (140 mg) under the skin every 14 (fourteen) days.  -     Referral to Cardiology; Future  Statin  intolerance  -     evolocumab (Repatha SureClick) 140 mg/mL injection; Inject 1 mL (140 mg) under the skin every 14 (fourteen) days.  Morbid obesity with BMI of 45.0-49.9, adult (CMS/HCC)  Other orders  -     Follow Up In Primary Care - Health Maintenance  -     Follow Up In Primary Care - Established; Future   Annual preventive visit  - Vaccinations patient declined all vaccines aware of risk and benefit  - Screen for colon cancer up-to-date  - Morbid obesity  BMI 47.6 patient counseled about weight loss low-carb diet  I spent >15minutes minutes face to face with individial providing recommendations for nutrition choices and exercise plan to help achieve weight reduction.  -Screen for depression negative  -    Follow-up  - Diabetes worsening hemoglobin A1c 7.7 patient counseled about compliance with diet weight loss  Unable to do Jardiance unable to use metformin patient takes now Trulicity without improvement need to switch to Ozempic 2 mg weekly reevaluate patient in 4 weeks  - Hypercholesterolemia patient discontinued Crestor with improvement in his joint pains patient counseled about risk of discontinue Crestor for almost a year now risk for stroke and heart attack patient will be started on Repatha every 2 weeks and then reintroducing statin a smaller dose patient aware of risk and benefit need to continue diet controlled  --Right knee pain improved after rest and weight loss continues Tylenol only for breakthrough pain avoid any NSAIDs  -CAD, 2 stents placed patient high risk for NSAIDs need to discontinue immediately compensated no chest pain continues aspirin daily  Refer patient to cardiology  - Obstructive sleep apnea compensated continue CPAP  -COPD uncontrolled did not improve with Trelegy s now on Tudorza twice a day doing well no complaints doing better  - Nicotine dependency-counseled about smoking cessation  Counseled about low-dose CT scanning patient declined at this time  -Hypertension  controlled  -Hypercholesterolemia improved continue low-fat diet continue with low-fat diet follow-up lipid profile  - Hypothyroid compensated continue levothyroxine   - Hypokalemia continue with potassium 10 mill equivalent daily continues high potassium diet, controlled  - Follow-up with patient for close monitoring patient to call immediately for any chest pain or shortness of breath follow-up results closely

## 2024-04-05 ENCOUNTER — PHARMACY VISIT (OUTPATIENT)
Dept: PHARMACY | Facility: CLINIC | Age: 59
End: 2024-04-05
Payer: COMMERCIAL

## 2024-04-05 PROCEDURE — RXMED WILLOW AMBULATORY MEDICATION CHARGE

## 2024-04-05 RX ORDER — CHLORHEXIDINE GLUCONATE ORAL RINSE 1.2 MG/ML
SOLUTION DENTAL
Qty: 473 ML | Refills: 0 | OUTPATIENT
Start: 2024-03-26

## 2024-04-05 RX ORDER — AMOXICILLIN 500 MG/1
500 TABLET, FILM COATED ORAL
Qty: 21 TABLET | Refills: 0 | OUTPATIENT
Start: 2024-03-26 | End: 2024-06-03 | Stop reason: SDUPTHER

## 2024-04-08 PROCEDURE — RXMED WILLOW AMBULATORY MEDICATION CHARGE

## 2024-04-09 ENCOUNTER — PHARMACY VISIT (OUTPATIENT)
Dept: PHARMACY | Facility: CLINIC | Age: 59
End: 2024-04-09
Payer: COMMERCIAL

## 2024-04-20 ENCOUNTER — APPOINTMENT (OUTPATIENT)
Dept: CARDIOLOGY | Facility: HOSPITAL | Age: 59
DRG: 322 | End: 2024-04-20
Payer: COMMERCIAL

## 2024-04-20 ENCOUNTER — APPOINTMENT (OUTPATIENT)
Dept: RADIOLOGY | Facility: HOSPITAL | Age: 59
DRG: 322 | End: 2024-04-20
Payer: COMMERCIAL

## 2024-04-20 ENCOUNTER — HOSPITAL ENCOUNTER (INPATIENT)
Facility: HOSPITAL | Age: 59
LOS: 3 days | Discharge: HOME | DRG: 322 | End: 2024-04-23
Attending: STUDENT IN AN ORGANIZED HEALTH CARE EDUCATION/TRAINING PROGRAM | Admitting: INTERNAL MEDICINE
Payer: COMMERCIAL

## 2024-04-20 DIAGNOSIS — I10 HYPERTENSION, UNCONTROLLED: ICD-10-CM

## 2024-04-20 DIAGNOSIS — I21.4 NSTEMI (NON-ST ELEVATED MYOCARDIAL INFARCTION) (MULTI): Primary | ICD-10-CM

## 2024-04-20 DIAGNOSIS — R07.9 CHEST PAIN, UNSPECIFIED TYPE: ICD-10-CM

## 2024-04-20 LAB
ALBUMIN SERPL BCP-MCNC: 4.5 G/DL (ref 3.4–5)
ALP SERPL-CCNC: 72 U/L (ref 33–120)
ALT SERPL W P-5'-P-CCNC: 29 U/L (ref 10–52)
ANION GAP SERPL CALC-SCNC: 11 MMOL/L (ref 10–20)
APPEARANCE UR: CLEAR
APTT PPP: 37 SECONDS (ref 27–38)
AST SERPL W P-5'-P-CCNC: 17 U/L (ref 9–39)
BASOPHILS # BLD AUTO: 0.07 X10*3/UL (ref 0–0.1)
BASOPHILS NFR BLD AUTO: 0.7 %
BILIRUB SERPL-MCNC: 0.3 MG/DL (ref 0–1.2)
BILIRUB UR STRIP.AUTO-MCNC: NEGATIVE MG/DL
BNP SERPL-MCNC: 39 PG/ML (ref 0–99)
BUN SERPL-MCNC: 14 MG/DL (ref 6–23)
CALCIUM SERPL-MCNC: 9.7 MG/DL (ref 8.6–10.3)
CARDIAC TROPONIN I PNL SERPL HS: 132 NG/L (ref 0–20)
CARDIAC TROPONIN I PNL SERPL HS: 24 NG/L (ref 0–20)
CARDIAC TROPONIN I PNL SERPL HS: 53 NG/L (ref 0–20)
CHLORIDE SERPL-SCNC: 101 MMOL/L (ref 98–107)
CO2 SERPL-SCNC: 27 MMOL/L (ref 21–32)
COLOR UR: YELLOW
CREAT SERPL-MCNC: 1.04 MG/DL (ref 0.5–1.3)
D DIMER PPP FEU-MCNC: 397 NG/ML FEU
EGFRCR SERPLBLD CKD-EPI 2021: 84 ML/MIN/1.73M*2
EOSINOPHIL # BLD AUTO: 0.3 X10*3/UL (ref 0–0.7)
EOSINOPHIL NFR BLD AUTO: 3 %
ERYTHROCYTE [DISTWIDTH] IN BLOOD BY AUTOMATED COUNT: 13 % (ref 11.5–14.5)
GLUCOSE BLD MANUAL STRIP-MCNC: 133 MG/DL (ref 74–99)
GLUCOSE BLD MANUAL STRIP-MCNC: 196 MG/DL (ref 74–99)
GLUCOSE BLD MANUAL STRIP-MCNC: 233 MG/DL (ref 74–99)
GLUCOSE BLD MANUAL STRIP-MCNC: 234 MG/DL (ref 74–99)
GLUCOSE SERPL-MCNC: 245 MG/DL (ref 74–99)
GLUCOSE UR STRIP.AUTO-MCNC: NEGATIVE MG/DL
HCT VFR BLD AUTO: 45.3 % (ref 41–52)
HGB BLD-MCNC: 15 G/DL (ref 13.5–17.5)
IMM GRANULOCYTES # BLD AUTO: 0.03 X10*3/UL (ref 0–0.7)
IMM GRANULOCYTES NFR BLD AUTO: 0.3 % (ref 0–0.9)
INR PPP: 0.9 (ref 0.9–1.1)
KETONES UR STRIP.AUTO-MCNC: NEGATIVE MG/DL
LEUKOCYTE ESTERASE UR QL STRIP.AUTO: NEGATIVE
LYMPHOCYTES # BLD AUTO: 3.38 X10*3/UL (ref 1.2–4.8)
LYMPHOCYTES NFR BLD AUTO: 34.3 %
MAGNESIUM SERPL-MCNC: 2 MG/DL (ref 1.6–2.4)
MCH RBC QN AUTO: 29.9 PG (ref 26–34)
MCHC RBC AUTO-ENTMCNC: 33.1 G/DL (ref 32–36)
MCV RBC AUTO: 90 FL (ref 80–100)
MONOCYTES # BLD AUTO: 0.54 X10*3/UL (ref 0.1–1)
MONOCYTES NFR BLD AUTO: 5.5 %
MUCOUS THREADS #/AREA URNS AUTO: NORMAL /LPF
NEUTROPHILS # BLD AUTO: 5.54 X10*3/UL (ref 1.2–7.7)
NEUTROPHILS NFR BLD AUTO: 56.2 %
NITRITE UR QL STRIP.AUTO: NEGATIVE
NRBC BLD-RTO: 0 /100 WBCS (ref 0–0)
PH UR STRIP.AUTO: 6 [PH]
PLATELET # BLD AUTO: 299 X10*3/UL (ref 150–450)
POTASSIUM SERPL-SCNC: 3.7 MMOL/L (ref 3.5–5.3)
PROT SERPL-MCNC: 7.8 G/DL (ref 6.4–8.2)
PROT UR STRIP.AUTO-MCNC: NEGATIVE MG/DL
PROTHROMBIN TIME: 10.4 SECONDS (ref 9.8–12.8)
RBC # BLD AUTO: 5.01 X10*6/UL (ref 4.5–5.9)
RBC # UR STRIP.AUTO: ABNORMAL /UL
RBC #/AREA URNS AUTO: NORMAL /HPF
SODIUM SERPL-SCNC: 135 MMOL/L (ref 136–145)
SP GR UR STRIP.AUTO: 1.01
UFH PPP CHRO-ACNC: 0.2 IU/ML
UFH PPP CHRO-ACNC: 0.3 IU/ML
UFH PPP CHRO-ACNC: 0.4 IU/ML
UFH PPP CHRO-ACNC: <0.1 IU/ML
UROBILINOGEN UR STRIP.AUTO-MCNC: <2 MG/DL
WBC # BLD AUTO: 9.9 X10*3/UL (ref 4.4–11.3)
WBC #/AREA URNS AUTO: NORMAL /HPF

## 2024-04-20 PROCEDURE — 2500000004 HC RX 250 GENERAL PHARMACY W/ HCPCS (ALT 636 FOR OP/ED): Performed by: STUDENT IN AN ORGANIZED HEALTH CARE EDUCATION/TRAINING PROGRAM

## 2024-04-20 PROCEDURE — 2500000001 HC RX 250 WO HCPCS SELF ADMINISTERED DRUGS (ALT 637 FOR MEDICARE OP): Performed by: STUDENT IN AN ORGANIZED HEALTH CARE EDUCATION/TRAINING PROGRAM

## 2024-04-20 PROCEDURE — 4A023N7 MEASUREMENT OF CARDIAC SAMPLING AND PRESSURE, LEFT HEART, PERCUTANEOUS APPROACH: ICD-10-PCS | Performed by: STUDENT IN AN ORGANIZED HEALTH CARE EDUCATION/TRAINING PROGRAM

## 2024-04-20 PROCEDURE — 85610 PROTHROMBIN TIME: CPT | Performed by: STUDENT IN AN ORGANIZED HEALTH CARE EDUCATION/TRAINING PROGRAM

## 2024-04-20 PROCEDURE — 84484 ASSAY OF TROPONIN QUANT: CPT | Performed by: STUDENT IN AN ORGANIZED HEALTH CARE EDUCATION/TRAINING PROGRAM

## 2024-04-20 PROCEDURE — 027034Z DILATION OF CORONARY ARTERY, ONE ARTERY WITH DRUG-ELUTING INTRALUMINAL DEVICE, PERCUTANEOUS APPROACH: ICD-10-PCS | Performed by: STUDENT IN AN ORGANIZED HEALTH CARE EDUCATION/TRAINING PROGRAM

## 2024-04-20 PROCEDURE — 1200000002 HC GENERAL ROOM WITH TELEMETRY DAILY

## 2024-04-20 PROCEDURE — 85379 FIBRIN DEGRADATION QUANT: CPT | Performed by: NURSE PRACTITIONER

## 2024-04-20 PROCEDURE — 36415 COLL VENOUS BLD VENIPUNCTURE: CPT | Performed by: STUDENT IN AN ORGANIZED HEALTH CARE EDUCATION/TRAINING PROGRAM

## 2024-04-20 PROCEDURE — 82947 ASSAY GLUCOSE BLOOD QUANT: CPT

## 2024-04-20 PROCEDURE — 83735 ASSAY OF MAGNESIUM: CPT | Performed by: STUDENT IN AN ORGANIZED HEALTH CARE EDUCATION/TRAINING PROGRAM

## 2024-04-20 PROCEDURE — 85520 HEPARIN ASSAY: CPT | Performed by: STUDENT IN AN ORGANIZED HEALTH CARE EDUCATION/TRAINING PROGRAM

## 2024-04-20 PROCEDURE — 2500000001 HC RX 250 WO HCPCS SELF ADMINISTERED DRUGS (ALT 637 FOR MEDICARE OP): Performed by: INTERNAL MEDICINE

## 2024-04-20 PROCEDURE — 93005 ELECTROCARDIOGRAM TRACING: CPT

## 2024-04-20 PROCEDURE — 99291 CRITICAL CARE FIRST HOUR: CPT | Mod: 25 | Performed by: STUDENT IN AN ORGANIZED HEALTH CARE EDUCATION/TRAINING PROGRAM

## 2024-04-20 PROCEDURE — 2500000002 HC RX 250 W HCPCS SELF ADMINISTERED DRUGS (ALT 637 FOR MEDICARE OP, ALT 636 FOR OP/ED): Performed by: STUDENT IN AN ORGANIZED HEALTH CARE EDUCATION/TRAINING PROGRAM

## 2024-04-20 PROCEDURE — 2500000001 HC RX 250 WO HCPCS SELF ADMINISTERED DRUGS (ALT 637 FOR MEDICARE OP): Performed by: NURSE PRACTITIONER

## 2024-04-20 PROCEDURE — 85520 HEPARIN ASSAY: CPT | Performed by: INTERNAL MEDICINE

## 2024-04-20 PROCEDURE — 84075 ASSAY ALKALINE PHOSPHATASE: CPT | Performed by: STUDENT IN AN ORGANIZED HEALTH CARE EDUCATION/TRAINING PROGRAM

## 2024-04-20 PROCEDURE — 85025 COMPLETE CBC W/AUTO DIFF WBC: CPT | Performed by: STUDENT IN AN ORGANIZED HEALTH CARE EDUCATION/TRAINING PROGRAM

## 2024-04-20 PROCEDURE — 83880 ASSAY OF NATRIURETIC PEPTIDE: CPT | Performed by: NURSE PRACTITIONER

## 2024-04-20 PROCEDURE — 71045 X-RAY EXAM CHEST 1 VIEW: CPT

## 2024-04-20 PROCEDURE — 71045 X-RAY EXAM CHEST 1 VIEW: CPT | Performed by: RADIOLOGY

## 2024-04-20 PROCEDURE — 2500000006 HC RX 250 W HCPCS SELF ADMINISTERED DRUGS (ALT 637 FOR ALL PAYERS): Performed by: NURSE PRACTITIONER

## 2024-04-20 PROCEDURE — 99285 EMERGENCY DEPT VISIT HI MDM: CPT | Mod: 25

## 2024-04-20 PROCEDURE — 96376 TX/PRO/DX INJ SAME DRUG ADON: CPT

## 2024-04-20 PROCEDURE — B2111ZZ FLUOROSCOPY OF MULTIPLE CORONARY ARTERIES USING LOW OSMOLAR CONTRAST: ICD-10-PCS | Performed by: STUDENT IN AN ORGANIZED HEALTH CARE EDUCATION/TRAINING PROGRAM

## 2024-04-20 PROCEDURE — 99223 1ST HOSP IP/OBS HIGH 75: CPT | Performed by: NURSE PRACTITIONER

## 2024-04-20 PROCEDURE — 93010 ELECTROCARDIOGRAM REPORT: CPT | Performed by: INTERNAL MEDICINE

## 2024-04-20 PROCEDURE — 96374 THER/PROPH/DIAG INJ IV PUSH: CPT

## 2024-04-20 PROCEDURE — 2500000004 HC RX 250 GENERAL PHARMACY W/ HCPCS (ALT 636 FOR OP/ED): Performed by: NURSE PRACTITIONER

## 2024-04-20 PROCEDURE — 81003 URINALYSIS AUTO W/O SCOPE: CPT | Performed by: NURSE PRACTITIONER

## 2024-04-20 RX ORDER — DEXTROSE 50 % IN WATER (D50W) INTRAVENOUS SYRINGE
25
Status: DISCONTINUED | OUTPATIENT
Start: 2024-04-20 | End: 2024-04-23 | Stop reason: HOSPADM

## 2024-04-20 RX ORDER — NAPROXEN SODIUM 220 MG/1
81 TABLET, FILM COATED ORAL DAILY
Status: DISCONTINUED | OUTPATIENT
Start: 2024-04-21 | End: 2024-04-23 | Stop reason: HOSPADM

## 2024-04-20 RX ORDER — MULTIVIT-MIN/IRON FUM/FOLIC AC 7.5 MG-4
1 TABLET ORAL DAILY
Status: DISCONTINUED | OUTPATIENT
Start: 2024-04-20 | End: 2024-04-23 | Stop reason: HOSPADM

## 2024-04-20 RX ORDER — FENOFIBRATE 54 MG/1
108 TABLET ORAL DAILY
Status: DISCONTINUED | OUTPATIENT
Start: 2024-04-20 | End: 2024-04-23 | Stop reason: HOSPADM

## 2024-04-20 RX ORDER — CARVEDILOL 12.5 MG/1
25 TABLET ORAL
Status: DISCONTINUED | OUTPATIENT
Start: 2024-04-21 | End: 2024-04-23 | Stop reason: HOSPADM

## 2024-04-20 RX ORDER — DEXTROSE 50 % IN WATER (D50W) INTRAVENOUS SYRINGE
12.5
Status: DISCONTINUED | OUTPATIENT
Start: 2024-04-20 | End: 2024-04-23 | Stop reason: HOSPADM

## 2024-04-20 RX ORDER — MAGNESIUM SULFATE HEPTAHYDRATE 40 MG/ML
2 INJECTION, SOLUTION INTRAVENOUS ONCE
Status: DISCONTINUED | OUTPATIENT
Start: 2024-04-20 | End: 2024-04-21

## 2024-04-20 RX ORDER — ACETAMINOPHEN 325 MG/1
975 TABLET ORAL ONCE
Status: DISCONTINUED | OUTPATIENT
Start: 2024-04-20 | End: 2024-04-21

## 2024-04-20 RX ORDER — PROCHLORPERAZINE MALEATE 5 MG
10 TABLET ORAL ONCE
Status: DISCONTINUED | OUTPATIENT
Start: 2024-04-20 | End: 2024-04-23 | Stop reason: HOSPADM

## 2024-04-20 RX ORDER — KETOROLAC TROMETHAMINE 15 MG/ML
15 INJECTION, SOLUTION INTRAMUSCULAR; INTRAVENOUS ONCE
Status: COMPLETED | OUTPATIENT
Start: 2024-04-20 | End: 2024-04-20

## 2024-04-20 RX ORDER — PROCHLORPERAZINE EDISYLATE 5 MG/ML
10 INJECTION INTRAMUSCULAR; INTRAVENOUS ONCE
Status: DISCONTINUED | OUTPATIENT
Start: 2024-04-20 | End: 2024-04-23 | Stop reason: HOSPADM

## 2024-04-20 RX ORDER — INSULIN LISPRO 100 [IU]/ML
0-10 INJECTION, SOLUTION INTRAVENOUS; SUBCUTANEOUS
Status: DISCONTINUED | OUTPATIENT
Start: 2024-04-20 | End: 2024-04-23 | Stop reason: HOSPADM

## 2024-04-20 RX ORDER — LOSARTAN POTASSIUM 50 MG/1
100 TABLET ORAL DAILY
Status: DISCONTINUED | OUTPATIENT
Start: 2024-04-20 | End: 2024-04-23 | Stop reason: HOSPADM

## 2024-04-20 RX ORDER — TOPIRAMATE 25 MG/1
50 TABLET ORAL 2 TIMES DAILY
Status: DISCONTINUED | OUTPATIENT
Start: 2024-04-20 | End: 2024-04-23 | Stop reason: HOSPADM

## 2024-04-20 RX ORDER — IBUPROFEN 200 MG
400 TABLET ORAL EVERY 6 HOURS PRN
Status: DISCONTINUED | OUTPATIENT
Start: 2024-04-20 | End: 2024-04-23 | Stop reason: HOSPADM

## 2024-04-20 RX ORDER — ALBUTEROL SULFATE 0.83 MG/ML
2.5 SOLUTION RESPIRATORY (INHALATION) EVERY 2 HOUR PRN
Status: DISCONTINUED | OUTPATIENT
Start: 2024-04-20 | End: 2024-04-23 | Stop reason: HOSPADM

## 2024-04-20 RX ORDER — NAPROXEN SODIUM 220 MG/1
324 TABLET, FILM COATED ORAL ONCE
Status: COMPLETED | OUTPATIENT
Start: 2024-04-20 | End: 2024-04-20

## 2024-04-20 RX ORDER — ATORVASTATIN CALCIUM 80 MG/1
80 TABLET, FILM COATED ORAL NIGHTLY
Status: DISCONTINUED | OUTPATIENT
Start: 2024-04-20 | End: 2024-04-23 | Stop reason: HOSPADM

## 2024-04-20 RX ORDER — CARVEDILOL 12.5 MG/1
12.5 TABLET ORAL
Status: DISCONTINUED | OUTPATIENT
Start: 2024-04-20 | End: 2024-04-20

## 2024-04-20 RX ORDER — HEPARIN SODIUM 10000 [USP'U]/100ML
0-4000 INJECTION, SOLUTION INTRAVENOUS CONTINUOUS
Status: DISCONTINUED | OUTPATIENT
Start: 2024-04-20 | End: 2024-04-23

## 2024-04-20 RX ORDER — DOCUSATE SODIUM 100 MG/1
100 CAPSULE, LIQUID FILLED ORAL 2 TIMES DAILY
Status: DISCONTINUED | OUTPATIENT
Start: 2024-04-20 | End: 2024-04-23 | Stop reason: HOSPADM

## 2024-04-20 RX ORDER — CARVEDILOL 25 MG/1
25 TABLET ORAL ONCE
Status: COMPLETED | OUTPATIENT
Start: 2024-04-20 | End: 2024-04-20

## 2024-04-20 RX ORDER — PROCHLORPERAZINE 25 MG/1
25 SUPPOSITORY RECTAL ONCE
Status: DISCONTINUED | OUTPATIENT
Start: 2024-04-20 | End: 2024-04-23 | Stop reason: HOSPADM

## 2024-04-20 RX ORDER — CARVEDILOL 6.25 MG/1
6.25 TABLET ORAL DAILY
Status: DISCONTINUED | OUTPATIENT
Start: 2024-04-20 | End: 2024-04-20

## 2024-04-20 RX ORDER — ALBUTEROL SULFATE 0.83 MG/ML
2.5 SOLUTION RESPIRATORY (INHALATION) EVERY 6 HOURS PRN
Status: DISCONTINUED | OUTPATIENT
Start: 2024-04-20 | End: 2024-04-20

## 2024-04-20 RX ORDER — TOPIRAMATE 25 MG/1
50 TABLET ORAL DAILY
Status: DISCONTINUED | OUTPATIENT
Start: 2024-04-20 | End: 2024-04-20

## 2024-04-20 RX ORDER — NITROGLYCERIN 0.4 MG/1
0.4 TABLET SUBLINGUAL ONCE
Status: COMPLETED | OUTPATIENT
Start: 2024-04-20 | End: 2024-04-20

## 2024-04-20 RX ORDER — LEVOTHYROXINE SODIUM 75 UG/1
75 TABLET ORAL
Status: DISCONTINUED | OUTPATIENT
Start: 2024-04-20 | End: 2024-04-23 | Stop reason: HOSPADM

## 2024-04-20 RX ORDER — AMLODIPINE BESYLATE 5 MG/1
10 TABLET ORAL DAILY
Status: DISCONTINUED | OUTPATIENT
Start: 2024-04-20 | End: 2024-04-23 | Stop reason: HOSPADM

## 2024-04-20 RX ORDER — LABETALOL HYDROCHLORIDE 5 MG/ML
10 INJECTION, SOLUTION INTRAVENOUS EVERY 6 HOURS PRN
Status: DISCONTINUED | OUTPATIENT
Start: 2024-04-20 | End: 2024-04-23 | Stop reason: HOSPADM

## 2024-04-20 RX ORDER — CHOLECALCIFEROL (VITAMIN D3) 125 MCG
5000 CAPSULE ORAL
Status: DISCONTINUED | OUTPATIENT
Start: 2024-04-20 | End: 2024-04-23 | Stop reason: HOSPADM

## 2024-04-20 RX ORDER — BUPROPION HYDROCHLORIDE 150 MG/1
150 TABLET ORAL
Status: DISCONTINUED | OUTPATIENT
Start: 2024-04-20 | End: 2024-04-23 | Stop reason: HOSPADM

## 2024-04-20 RX ADMIN — LABETALOL HYDROCHLORIDE 10 MG: 5 INJECTION, SOLUTION INTRAVENOUS at 09:02

## 2024-04-20 RX ADMIN — TICAGRELOR 90 MG: 90 TABLET ORAL at 21:04

## 2024-04-20 RX ADMIN — HEPARIN SODIUM 14 UNITS/HR: 10000 INJECTION, SOLUTION INTRAVENOUS at 18:24

## 2024-04-20 RX ADMIN — LOSARTAN POTASSIUM 100 MG: 50 TABLET, FILM COATED ORAL at 10:04

## 2024-04-20 RX ADMIN — LABETALOL HYDROCHLORIDE 10 MG: 5 INJECTION, SOLUTION INTRAVENOUS at 21:04

## 2024-04-20 RX ADMIN — TICAGRELOR 180 MG: 90 TABLET ORAL at 10:22

## 2024-04-20 RX ADMIN — INSULIN LISPRO 2 UNITS: 100 INJECTION, SOLUTION INTRAVENOUS; SUBCUTANEOUS at 17:41

## 2024-04-20 RX ADMIN — IBUPROFEN 400 MG: 400 TABLET, FILM COATED ORAL at 17:40

## 2024-04-20 RX ADMIN — CARVEDILOL 6.25 MG: 6.25 TABLET, FILM COATED ORAL at 06:33

## 2024-04-20 RX ADMIN — TOPIRAMATE 50 MG: 25 TABLET, FILM COATED ORAL at 06:32

## 2024-04-20 RX ADMIN — ASPIRIN 81 MG 243 MG: 81 TABLET ORAL at 03:34

## 2024-04-20 RX ADMIN — Medication 1 TABLET: at 09:10

## 2024-04-20 RX ADMIN — CARVEDILOL 25 MG: 25 TABLET, FILM COATED ORAL at 18:48

## 2024-04-20 RX ADMIN — LEVOTHYROXINE SODIUM 75 MCG: 75 TABLET ORAL at 06:32

## 2024-04-20 RX ADMIN — BUPROPION HYDROCHLORIDE 150 MG: 150 TABLET, EXTENDED RELEASE ORAL at 06:33

## 2024-04-20 RX ADMIN — KETOROLAC TROMETHAMINE 15 MG: 15 INJECTION, SOLUTION INTRAMUSCULAR; INTRAVENOUS at 18:48

## 2024-04-20 RX ADMIN — AMLODIPINE BESYLATE 10 MG: 10 TABLET ORAL at 11:19

## 2024-04-20 RX ADMIN — NITROGLYCERIN 0.4 MG: 0.4 TABLET SUBLINGUAL at 03:35

## 2024-04-20 RX ADMIN — TIOTROPIUM BROMIDE INHALATION SPRAY 2 PUFF: 3.12 SPRAY, METERED RESPIRATORY (INHALATION) at 09:09

## 2024-04-20 RX ADMIN — HEPARIN SODIUM 14 UNITS/HR: 10000 INJECTION, SOLUTION INTRAVENOUS at 10:02

## 2024-04-20 RX ADMIN — CARVEDILOL 12.5 MG: 12.5 TABLET, FILM COATED ORAL at 10:04

## 2024-04-20 RX ADMIN — Medication 125 MCG: at 06:32

## 2024-04-20 RX ADMIN — INSULIN LISPRO 4 UNITS: 100 INJECTION, SOLUTION INTRAVENOUS; SUBCUTANEOUS at 09:11

## 2024-04-20 RX ADMIN — HEPARIN SODIUM 1000 UNITS/HR: 10000 INJECTION, SOLUTION INTRAVENOUS at 04:34

## 2024-04-20 RX ADMIN — EVOLOCUMAB 140 MG: 140 INJECTION, SOLUTION SUBCUTANEOUS at 18:25

## 2024-04-20 SDOH — SOCIAL STABILITY: SOCIAL INSECURITY: HAVE YOU HAD ANY THOUGHTS OF HARMING ANYONE ELSE?: NO

## 2024-04-20 SDOH — SOCIAL STABILITY: SOCIAL INSECURITY: ABUSE: ADULT

## 2024-04-20 SDOH — SOCIAL STABILITY: SOCIAL INSECURITY: ARE THERE ANY APPARENT SIGNS OF INJURIES/BEHAVIORS THAT COULD BE RELATED TO ABUSE/NEGLECT?: NO

## 2024-04-20 SDOH — SOCIAL STABILITY: SOCIAL INSECURITY: HAS ANYONE EVER THREATENED TO HURT YOUR FAMILY OR YOUR PETS?: NO

## 2024-04-20 SDOH — SOCIAL STABILITY: SOCIAL INSECURITY: WERE YOU ABLE TO COMPLETE ALL THE BEHAVIORAL HEALTH SCREENINGS?: YES

## 2024-04-20 SDOH — SOCIAL STABILITY: SOCIAL INSECURITY: ARE YOU OR HAVE YOU BEEN THREATENED OR ABUSED PHYSICALLY, EMOTIONALLY, OR SEXUALLY BY ANYONE?: NO

## 2024-04-20 SDOH — SOCIAL STABILITY: SOCIAL INSECURITY: DO YOU FEEL ANYONE HAS EXPLOITED OR TAKEN ADVANTAGE OF YOU FINANCIALLY OR OF YOUR PERSONAL PROPERTY?: NO

## 2024-04-20 SDOH — SOCIAL STABILITY: SOCIAL INSECURITY: HAVE YOU HAD THOUGHTS OF HARMING ANYONE ELSE?: NO

## 2024-04-20 SDOH — SOCIAL STABILITY: SOCIAL INSECURITY: DO YOU FEEL UNSAFE GOING BACK TO THE PLACE WHERE YOU ARE LIVING?: NO

## 2024-04-20 SDOH — SOCIAL STABILITY: SOCIAL INSECURITY: DOES ANYONE TRY TO KEEP YOU FROM HAVING/CONTACTING OTHER FRIENDS OR DOING THINGS OUTSIDE YOUR HOME?: NO

## 2024-04-20 ASSESSMENT — ENCOUNTER SYMPTOMS
FEVER: 0
ABDOMINAL DISTENTION: 1
HEADACHES: 0
CHILLS: 0
AGITATION: 0
EYE REDNESS: 0
DIZZINESS: 0
DIAPHORESIS: 0
EYE ITCHING: 0
NECK PAIN: 0
POLYDIPSIA: 0
BRUISES/BLEEDS EASILY: 0
CONFUSION: 0
DIARRHEA: 0
SINUS PAIN: 0
CONSTIPATION: 0
COUGH: 0
NECK STIFFNESS: 0
DYSURIA: 0
SINUS PRESSURE: 0

## 2024-04-20 ASSESSMENT — COGNITIVE AND FUNCTIONAL STATUS - GENERAL
DAILY ACTIVITIY SCORE: 24
MOBILITY SCORE: 24
DAILY ACTIVITIY SCORE: 24
MOBILITY SCORE: 24
PATIENT BASELINE BEDBOUND: NO
DAILY ACTIVITIY SCORE: 24
MOBILITY SCORE: 24

## 2024-04-20 ASSESSMENT — ACTIVITIES OF DAILY LIVING (ADL)
HEARING - LEFT EAR: FUNCTIONAL
JUDGMENT_ADEQUATE_SAFELY_COMPLETE_DAILY_ACTIVITIES: YES
LACK_OF_TRANSPORTATION: NO
GROOMING: INDEPENDENT
ADEQUATE_TO_COMPLETE_ADL: YES
WALKS IN HOME: INDEPENDENT
DRESSING YOURSELF: INDEPENDENT
FEEDING YOURSELF: INDEPENDENT
PATIENT'S MEMORY ADEQUATE TO SAFELY COMPLETE DAILY ACTIVITIES?: YES
LACK_OF_TRANSPORTATION: NO
BATHING: INDEPENDENT
HEARING - RIGHT EAR: FUNCTIONAL
TOILETING: INDEPENDENT

## 2024-04-20 ASSESSMENT — COLUMBIA-SUICIDE SEVERITY RATING SCALE - C-SSRS
2. HAVE YOU ACTUALLY HAD ANY THOUGHTS OF KILLING YOURSELF?: NO
1. IN THE PAST MONTH, HAVE YOU WISHED YOU WERE DEAD OR WISHED YOU COULD GO TO SLEEP AND NOT WAKE UP?: NO
1. IN THE PAST MONTH, HAVE YOU WISHED YOU WERE DEAD OR WISHED YOU COULD GO TO SLEEP AND NOT WAKE UP?: NO
6. HAVE YOU EVER DONE ANYTHING, STARTED TO DO ANYTHING, OR PREPARED TO DO ANYTHING TO END YOUR LIFE?: NO
2. HAVE YOU ACTUALLY HAD ANY THOUGHTS OF KILLING YOURSELF?: NO
6. HAVE YOU EVER DONE ANYTHING, STARTED TO DO ANYTHING, OR PREPARED TO DO ANYTHING TO END YOUR LIFE?: NO

## 2024-04-20 ASSESSMENT — PAIN SCALES - GENERAL
PAINLEVEL_OUTOF10: 3
PAINLEVEL_OUTOF10: 3
PAINLEVEL_OUTOF10: 2
PAINLEVEL_OUTOF10: 0 - NO PAIN
PAINLEVEL_OUTOF10: 0 - NO PAIN

## 2024-04-20 ASSESSMENT — PAIN - FUNCTIONAL ASSESSMENT
PAIN_FUNCTIONAL_ASSESSMENT: 0-10

## 2024-04-20 ASSESSMENT — LIFESTYLE VARIABLES
HOW MANY STANDARD DRINKS CONTAINING ALCOHOL DO YOU HAVE ON A TYPICAL DAY: PATIENT DOES NOT DRINK
HOW OFTEN DO YOU HAVE A DRINK CONTAINING ALCOHOL: NEVER
HOW OFTEN DO YOU HAVE 6 OR MORE DRINKS ON ONE OCCASION: NEVER
SKIP TO QUESTIONS 9-10: 1
AUDIT-C TOTAL SCORE: 0
AUDIT-C TOTAL SCORE: 0

## 2024-04-20 ASSESSMENT — PATIENT HEALTH QUESTIONNAIRE - PHQ9
2. FEELING DOWN, DEPRESSED OR HOPELESS: NOT AT ALL
SUM OF ALL RESPONSES TO PHQ9 QUESTIONS 1 & 2: 0
1. LITTLE INTEREST OR PLEASURE IN DOING THINGS: NOT AT ALL

## 2024-04-20 NOTE — H&P
History Of Present Illness  Lyle Oh is a 58 y.o. male with a pertinent medical history of morbid obesity, insulin-dependent diabetes, hypothyroidism, obstructive sleep apnea, depression, hypertension, nicotine use disorder, hyperlipidemia not on statin, CAD with coronary stent placement x 3 with last stent being placed 2019 who presented to [facility] ER overnight with complaint of midsternal LCW non reproducible chest pain that awoke him out of his sleep. He said he had radiating paresthesias in his left arm. He mentions having a few episodes of chest discomfort over the last few days with exertional activity such as loading heavy boxes into his truck and hand sanding drywall. He denied jaw pain, diaphoresis, radiating neck or back pain. His chest pain currently is minimal. Pertinent workup in the ER included: EKG that showed new T wave depressions in leads III and aVF, troponin 24, 53, 132, BNP was under 100, magnesium over 2, CBC unremarkable, chest x-ray showed no acute abnormalities.   TSH collected on April 3 when and within normal limits, lipid panel collected on April 3rd showed total cholesterol 263, triglyceride 204,  and HDL 39.  A1c 7.7.     Past Medical History  He has a past medical history of Body mass index (BMI)40.0-44.9, adult (09/09/2021), Morbid (severe) obesity due to excess calories (Multi) (09/09/2021), insulin-dependent diabetes mellitus, COPD, hypothyroidism, obstructive sleep apnea, depression with anxiety, hypertension, nicotine use disorder, CAD with coronary stent placement last placement 2019, hyperlipidemia.    Surgical History  Multiple cardiac stent placements x 3, Foot surgery.     Social History  His smoking use included cigarettes and has a 40-year pack history; smokes a pack a day. He has never used smokeless tobacco. He reports that he does not drink alcohol and does not use drugs. 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    Family  History  Family History   Problem Relation Name Age of Onset    No Known Problems Mother      Heart attack Father       Allergies  Patient has no known allergies.    Review of Systems   Constitutional:  Negative for chills, diaphoresis and fever.   HENT:  Negative for sinus pressure and sinus pain.    Eyes:  Negative for redness and itching.   Respiratory:  Negative for cough.    Cardiovascular:  Positive for chest pain. Negative for leg swelling.        Has radiating paraesthesias in left arm, denies radiating jaw, neck, or back pain   Gastrointestinal:  Positive for abdominal distention. Negative for constipation and diarrhea.   Endocrine: Negative for polydipsia and polyuria.   Genitourinary:  Negative for dysuria and urgency.   Musculoskeletal:  Negative for neck pain and neck stiffness.   Skin:  Negative for pallor and rash.   Neurological:  Negative for dizziness and headaches.   Hematological:  Does not bruise/bleed easily.   Psychiatric/Behavioral:  Negative for agitation, behavioral problems and confusion.         Physical Exam  Constitutional:       General: He is not in acute distress.     Appearance: Normal appearance. He is obese. He is not ill-appearing, toxic-appearing or diaphoretic.   HENT:      Head: Normocephalic and atraumatic.      Mouth/Throat:      Mouth: Mucous membranes are moist.      Pharynx: Oropharynx is clear.   Eyes:      Extraocular Movements: Extraocular movements intact.      Conjunctiva/sclera: Conjunctivae normal.   Cardiovascular:      Rate and Rhythm: Normal rate and regular rhythm.      Pulses: Normal pulses.      Heart sounds: No murmur heard.     Comments: Non reproducible CP  Pulmonary:      Effort: Pulmonary effort is normal.      Breath sounds: Normal breath sounds.      Comments: No use of accessory muscles  Abdominal:      General: Bowel sounds are normal. There is no distension.      Palpations: There is no mass.      Tenderness: There is no abdominal tenderness. There  is no guarding or rebound.      Hernia: No hernia is present.   Musculoskeletal:         General: No swelling. Normal range of motion.      Cervical back: Normal range of motion.      Right lower leg: No edema.      Left lower leg: No edema.   Skin:     General: Skin is warm and dry.      Coloration: Skin is pale.      Findings: No bruising or erythema.   Neurological:      Mental Status: He is alert. Mental status is at baseline. He is disoriented.   Psychiatric:         Mood and Affect: Mood normal.         Behavior: Behavior normal.       Last Recorded Vitals  BP (!) 197/122   Pulse 92   Temp 36.6 °C (97.9 °F)   Resp 18   Wt 144 kg (318 lb)   SpO2 100%     Relevant Results  Scheduled medications  [START ON 4/21/2024] aspirin, 81 mg, oral, Daily  atorvastatin, 80 mg, oral, Nightly  buPROPion XL, 150 mg, oral, Daily before breakfast  carvedilol, 6.25 mg, oral, Daily  cholecalciferol, 5,000 Units, oral, Every Day  docusate sodium, 100 mg, oral, BID  insulin lispro, 0-10 Units, subcutaneous, TID with meals  levothyroxine, 75 mcg, oral, Daily before breakfast  losartan, 100 mg, oral, Daily  multivitamin with minerals, 1 tablet, oral, Daily  oxygen, , inhalation, Continuous - Inhalation  perflutren lipid microspheres, 0.5-10 mL of dilution, intravenous, Once in imaging  perflutren protein A microsphere, 0.5 mL, intravenous, Once in imaging  sulfur hexafluoride microsphr, 2 mL, intravenous, Once in imaging  ticagrelor, 180 mg, oral, Once   Followed by  ticagrelor, 90 mg, oral, BID  tiotropium, 2 puff, inhalation, Daily  topiramate, 50 mg, oral, Daily      Continuous medications  heparin, 0-4,000 Units/hr, Last Rate: 1,000 Units/hr (04/20/24 0434)      PRN medications  PRN medications: albuterol, dextrose, dextrose, glucagon, glucagon, heparin    Results for orders placed or performed during the hospital encounter of 04/20/24 (from the past 24 hour(s))   CBC and Auto Differential   Result Value Ref Range    WBC 9.9  4.4 - 11.3 x10*3/uL    nRBC 0.0 0.0 - 0.0 /100 WBCs    RBC 5.01 4.50 - 5.90 x10*6/uL    Hemoglobin 15.0 13.5 - 17.5 g/dL    Hematocrit 45.3 41.0 - 52.0 %    MCV 90 80 - 100 fL    MCH 29.9 26.0 - 34.0 pg    MCHC 33.1 32.0 - 36.0 g/dL    RDW 13.0 11.5 - 14.5 %    Platelets 299 150 - 450 x10*3/uL    Neutrophils % 56.2 40.0 - 80.0 %    Immature Granulocytes %, Automated 0.3 0.0 - 0.9 %    Lymphocytes % 34.3 13.0 - 44.0 %    Monocytes % 5.5 2.0 - 10.0 %    Eosinophils % 3.0 0.0 - 6.0 %    Basophils % 0.7 0.0 - 2.0 %    Neutrophils Absolute 5.54 1.20 - 7.70 x10*3/uL    Immature Granulocytes Absolute, Automated 0.03 0.00 - 0.70 x10*3/uL    Lymphocytes Absolute 3.38 1.20 - 4.80 x10*3/uL    Monocytes Absolute 0.54 0.10 - 1.00 x10*3/uL    Eosinophils Absolute 0.30 0.00 - 0.70 x10*3/uL    Basophils Absolute 0.07 0.00 - 0.10 x10*3/uL   Comprehensive Metabolic Panel   Result Value Ref Range    Glucose 245 (H) 74 - 99 mg/dL    Sodium 135 (L) 136 - 145 mmol/L    Potassium 3.7 3.5 - 5.3 mmol/L    Chloride 101 98 - 107 mmol/L    Bicarbonate 27 21 - 32 mmol/L    Anion Gap 11 10 - 20 mmol/L    Urea Nitrogen 14 6 - 23 mg/dL    Creatinine 1.04 0.50 - 1.30 mg/dL    eGFR 84 >60 mL/min/1.73m*2    Calcium 9.7 8.6 - 10.3 mg/dL    Albumin 4.5 3.4 - 5.0 g/dL    Alkaline Phosphatase 72 33 - 120 U/L    Total Protein 7.8 6.4 - 8.2 g/dL    AST 17 9 - 39 U/L    Bilirubin, Total 0.3 0.0 - 1.2 mg/dL    ALT 29 10 - 52 U/L   Magnesium   Result Value Ref Range    Magnesium 2.00 1.60 - 2.40 mg/dL   Troponin I, High Sensitivity, Initial   Result Value Ref Range    Troponin I, High Sensitivity 24 (H) 0 - 20 ng/L   B-type natriuretic peptide   Result Value Ref Range    BNP 39 0 - 99 pg/mL   Troponin, High Sensitivity, 1 Hour   Result Value Ref Range    Troponin I, High Sensitivity 53 (HH) 0 - 20 ng/L   Coagulation Screen   Result Value Ref Range    Protime 10.4 9.8 - 12.8 seconds    INR 0.9 0.9 - 1.1    aPTT 37 27 - 38 seconds   Troponin I, High Sensitivity    Result Value Ref Range    Troponin I, High Sensitivity 132 (HH) 0 - 20 ng/L     XR chest 1 view    Result Date: 4/20/2024  Interpreted By:  Fareed Mason, STUDY: XR CHEST 1 VIEW;  4/20/2024 3:30 am   INDICATION: Signs/Symptoms:Chest Pain.   COMPARISON: 5/21/2018   ACCESSION NUMBER(S): [id number]   ORDERING CLINICIAN: LYNN LOPEZ   FINDINGS: The cardiac silhouette is stable in size. No focal airspace consolidation or pleural effusion. No pneumothorax.       No airspace consolidation or pleural effusion.   MACRO: None   Signed by: Fareed Mason 4/20/2024 3:58 AM Dictation workstation:   [id number]     Assessment/Plan   Principal Problem:  NSTEMI (non-ST elevated myocardial infarction) (Multi)/HX CAD and multiple cardiac stents  -Suspect will need a heart cath, chest pain very minimal  -HEART Score 9  -lateral lead depressions in III and AVF, troponin 24, 53, 132  Heparin, Brilinta, nitroglycerin   Repeat Serial troponin/EKG-f/U  Aspirin and high dose statin therapy started  On Repatha at home  Seeharsha Youssef, cardiology consulted-appreciate recs  ECHO-f/u  Lipid level completed 4/3/24 , , HDL 38, trig 204  BNP 39, Mg level 2, TSH on 4/3 24 WNL  All home cardiac meds resumed-takes coreg daily, not BID, losartan  Cardiac diet/Daily weights/I/O's  Telemetry/Oxygen  Cardiac rehab rec  D-dimer-f/U    Depression/Obesity/Metabolic Syndrome   -Tri >150, wait over 40 inches, systolic BP >130, HDL <40  Wellbutrin, Topamax  Wt loss encouraged  Maintain bsg under 150, added high dose statin, fenofibrate and improve BP control, increased coreg dosing to 12.5 BID, increased topamax, continue losartan, added PRN labetalol    Uncontrolled HTN Stage 2  Increased coreg dosing to 12.5 BID, increased topamax, continue losartan, added PRN labetalol  Check UA for proteinuria-f/U    DM II with Hyperglycemia  -A1C 7.7 2 weeks ago  Sliding scale and accucheck ACHS  Hold home meds (injectables)    Vitamin D Deficiency  Vit  d replacements    Hypothyroidism  TSH WNL  Synthroid    STEVEN  CPAP    COPD  Spiriva, PRN albuterol    Full Code    DVT PX  Heparin    Rachel Quintanilla, APRN-CNP  80 min spent reviewing chart, placing orders and making med adjustments, assessing and interviewing pt and updating care plan.

## 2024-04-20 NOTE — PROGRESS NOTES
Daily rounding:  Chest pain has resolved.  Having a headache that started since he got nitroglycerin.  Also feels like he is having caffeine withdrawal.  Added ibuprofen per request.  Plan for coronary angio on Monday.

## 2024-04-20 NOTE — CONSULTS
Consults  History Of Present Illness:    Lyle Oh is a 58 y.o. male presenting with Acute chest pain.    The patient was in his usual state of health til  earlier this morning when he woke up with severe retrosternal and left precordial burning chest discomfort with radiation to the left upper extremity accompanied by numbness and tingling.    The patient reports that he has had some exertional chest pain to the drop was but this is the first time he has experienced resting chest discomfort.  Currently the discomfort is significantly improved but not resolved.    Electrocardiogram shows no ST elevation or ST depression cardiac troponin sammie from .  TSH, within normal limits cholesterol 263 triglycerides 204  hemoglobin A1c 7.7.    Old records reviewed.    Past Medical History:  He has a past medical history of Body mass index (BMI)40.0-44.9, adult (09/09/2021), Morbid (severe) obesity due to excess calories (Multi) (09/09/2021), Obesity, unspecified (10/18/2019), Personal history of other diseases of the circulatory system (07/25/2017), Personal history of other diseases of the musculoskeletal system and connective tissue (12/03/2020), and Personal history of other diseases of the respiratory system (03/19/2019).    Past Surgical History:  He has a past surgical history that includes Other surgical history (06/02/2014).      Social History:  He reports that he has quit smoking. His smoking use included cigarettes. He has never used smokeless tobacco. He reports that he does not drink alcohol and does not use drugs.    Family History:  Family History   Problem Relation Name Age of Onset    No Known Problems Mother      Heart attack Father          Allergies:  Patient has no known allergies.    Outpatient Medications:  Current Outpatient Medications   Medication Instructions    aclidinium (Tudorza Pressair) 400 mcg/actuation inhaler Inhale 1 puff  by mouth 2 times a day.    amoxicillin (Amoxil)  500 mg tablet Take 1 tablet (500 mg) by mouth one hour prior to surgical appt and then one tablet every 8 hours until gone.    aspirin 81 mg EC tablet 1 tablet, oral, Daily    buPROPion XL (Wellbutrin XL) 150 mg 24 hr tablet TAKE 1 TABLET BY MOUTH EVERY MORNING    carvedilol (Coreg) 12.5 mg tablet TAKE 1/2 TABLET BY MOUTH EVERY MORNING ONLY    chlorhexidine (Peridex) 0.12 % solution Use to rinse mouth (swish for 30 seconds and then expectorate) twice a day, starting the day after surgery.    cholecalciferol (Vitamin D-3) 5,000 Units tablet 1 tablet, oral, Every Day    levothyroxine (Synthroid, Levoxyl) 50 mcg tablet Take 1.5 tablets (75mcg) by mouth once daily in the morning. Take before meals.    losartan (Cozaar) 100 mg tablet TAKE 1 TABLET BY MOUTH ONCE DAILY    nitroglycerin (NITROSTAT) 0.4 mg, sublingual, Every 5 min PRN    Ozempic 2 mg, subcutaneous, Once Weekly    Repatha SureClick 140 mg, subcutaneous, Every 14 days    sildenafil (VIAGRA) 100 mg, oral, As needed, 1 hour before needed    topiramate (Topamax) 25 mg tablet 2 tablets, oral, 2 times daily    Ventolin HFA 90 mcg/actuation inhaler INHALE 2 PUFFS EVERY 4 HOURS IF NEEDED FOR WHEEZING. 4-6 HOURS AS NEEDED    vit B/folic/choline/inos/herbs (ULTRA B-100 COMPLEX, FOODBASE, ORAL) 1 tablet, oral, Daily, As directed         Inpatient Medications:  PRN medications   Medication    albuterol    dextrose    dextrose    glucagon    glucagon    heparin    labetaloL    nitroglycerin     Continuous Medications   Medication Dose Last Rate    heparin  0-4,000 Units/hr 14 Units/hr (04/20/24 1002)       Last Labs:  Results for orders placed or performed during the hospital encounter of 04/20/24 (from the past 96 hour(s))   CBC and Auto Differential   Result Value Ref Range    WBC 9.9 4.4 - 11.3 x10*3/uL    nRBC 0.0 0.0 - 0.0 /100 WBCs    RBC 5.01 4.50 - 5.90 x10*6/uL    Hemoglobin 15.0 13.5 - 17.5 g/dL    Hematocrit 45.3 41.0 - 52.0 %    MCV 90 80 - 100 fL    MCH 29.9  26.0 - 34.0 pg    MCHC 33.1 32.0 - 36.0 g/dL    RDW 13.0 11.5 - 14.5 %    Platelets 299 150 - 450 x10*3/uL    Neutrophils % 56.2 40.0 - 80.0 %    Immature Granulocytes %, Automated 0.3 0.0 - 0.9 %    Lymphocytes % 34.3 13.0 - 44.0 %    Monocytes % 5.5 2.0 - 10.0 %    Eosinophils % 3.0 0.0 - 6.0 %    Basophils % 0.7 0.0 - 2.0 %    Neutrophils Absolute 5.54 1.20 - 7.70 x10*3/uL    Immature Granulocytes Absolute, Automated 0.03 0.00 - 0.70 x10*3/uL    Lymphocytes Absolute 3.38 1.20 - 4.80 x10*3/uL    Monocytes Absolute 0.54 0.10 - 1.00 x10*3/uL    Eosinophils Absolute 0.30 0.00 - 0.70 x10*3/uL    Basophils Absolute 0.07 0.00 - 0.10 x10*3/uL   Comprehensive Metabolic Panel   Result Value Ref Range    Glucose 245 (H) 74 - 99 mg/dL    Sodium 135 (L) 136 - 145 mmol/L    Potassium 3.7 3.5 - 5.3 mmol/L    Chloride 101 98 - 107 mmol/L    Bicarbonate 27 21 - 32 mmol/L    Anion Gap 11 10 - 20 mmol/L    Urea Nitrogen 14 6 - 23 mg/dL    Creatinine 1.04 0.50 - 1.30 mg/dL    eGFR 84 >60 mL/min/1.73m*2    Calcium 9.7 8.6 - 10.3 mg/dL    Albumin 4.5 3.4 - 5.0 g/dL    Alkaline Phosphatase 72 33 - 120 U/L    Total Protein 7.8 6.4 - 8.2 g/dL    AST 17 9 - 39 U/L    Bilirubin, Total 0.3 0.0 - 1.2 mg/dL    ALT 29 10 - 52 U/L   Magnesium   Result Value Ref Range    Magnesium 2.00 1.60 - 2.40 mg/dL   Troponin I, High Sensitivity, Initial   Result Value Ref Range    Troponin I, High Sensitivity 24 (H) 0 - 20 ng/L   B-type natriuretic peptide   Result Value Ref Range    BNP 39 0 - 99 pg/mL   Troponin, High Sensitivity, 1 Hour   Result Value Ref Range    Troponin I, High Sensitivity 53 (HH) 0 - 20 ng/L   Coagulation Screen   Result Value Ref Range    Protime 10.4 9.8 - 12.8 seconds    INR 0.9 0.9 - 1.1    aPTT 37 27 - 38 seconds   D-dimer, VTE Exclusion   Result Value Ref Range    D-Dimer, Quantitative VTE Exclusion 397 <=500 ng/mL FEU   Troponin I, High Sensitivity   Result Value Ref Range    Troponin I, High Sensitivity 132 (HH) 0 - 20 ng/L    POCT GLUCOSE   Result Value Ref Range    POCT Glucose 234 (H) 74 - 99 mg/dL   Urinalysis with Reflex Microscopic   Result Value Ref Range    Color, Urine Yellow Straw, Yellow    Appearance, Urine Clear Clear    Specific Gravity, Urine 1.013 1.005 - 1.035    pH, Urine 6.0 5.0, 5.5, 6.0, 6.5, 7.0, 7.5, 8.0    Protein, Urine NEGATIVE NEGATIVE mg/dL    Glucose, Urine NEGATIVE NEGATIVE mg/dL    Blood, Urine SMALL (1+) (A) NEGATIVE    Ketones, Urine NEGATIVE NEGATIVE mg/dL    Bilirubin, Urine NEGATIVE NEGATIVE    Urobilinogen, Urine <2.0 <2.0 mg/dL    Nitrite, Urine NEGATIVE NEGATIVE    Leukocyte Esterase, Urine NEGATIVE NEGATIVE   Microscopic Only, Urine   Result Value Ref Range    WBC, Urine NONE 1-5, NONE /HPF    RBC, Urine 1-2 NONE, 1-2, 3-5 /HPF    Mucus, Urine FEW Reference range not established. /LPF   Heparin Assay   Result Value Ref Range    Heparin Unfractionated <0.1 See Comment Below for Therapeutic Ranges IU/mL   POCT GLUCOSE   Result Value Ref Range    POCT Glucose 233 (H) 74 - 99 mg/dL   Heparin Assay   Result Value Ref Range    Heparin Unfractionated 0.3 See Comment Below for Therapeutic Ranges IU/mL          Last Recorded Vitals:  Vitals:    04/20/24 0930 04/20/24 0958 04/20/24 1118 04/20/24 1309   BP: (!) 171/106 (!) 164/109 (!) 165/109 (!) 165/106   BP Location: Right arm      Patient Position: Lying      Pulse: 79  82 78   Resp: 20      Temp: 36.3 °C (97.3 °F)      TempSrc: Temporal      SpO2: 98%      Weight:       Height:         No intake/output data recorded.      Physical Exam:  Constitutional: Well developed, awake/alert/oriented x3, no distress, alert and cooperative  Eyes: PERRL, EOMI, clear sclera  ENMT: mucous membranes moist, no apparent injury, no lesions seen  Head/Neck: Neck supple, no apparent injury, thyroid without mass or tenderness, No JVD, trachea midline, no bruits  Respiratory/Thorax: Patent airways, CTAB, normal breath sounds with good chest expansion, thorax  symmetric  Cardiovascular: Regular, rate and rhythm, no murmurs, 2+ equal pulses of the extremities, normal S 1and S 2  Gastrointestinal: Nondistended, soft, non-tender, no rebound tenderness or guarding, no masses palpable, no organomegaly, +BS, no bruits  Musculoskeletal: ROM intact, no joint swelling, normal strength  Extremities: normal extremities, no cyanosis edema, contusions or wounds, no clubbing  Neurological: alert and oriented x3, intact senses, motor, response and reflexes, normal strength  Lymphatic: No significant lymphadenopathy  Psychological: Appropriate mood and behavior  Skin: Warm and dry, no lesions, no rashes     Assessment/Plan   Problem List Items Addressed This Visit          Cardiac and Vasculature    Hypertension, uncontrolled    Relevant Orders    Case Request Cath Lab: Left Heart Cath, No LV (Completed)    * (Principal) NSTEMI (non-ST elevated myocardial infarction) (Multi) - Primary    Relevant Medications    nitroglycerin (Nitrostat) SL tablet 0.4 mg (Completed)    ticagrelor (Brilinta) tablet 180 mg (Completed)    ticagrelor (Brilinta) tablet 90 mg (Start on 4/20/2024  9:00 PM)    carvedilol (Coreg) tablet 12.5 mg    nitroglycerin (Kenan-Bid) 2 % ointment 0.5 inch    labetaloL (Normodyne,Trandate) injection 10 mg    amLODIPine (Norvasc) tablet 10 mg    Other Relevant Orders    Transthoracic Echo (TTE) Complete     Other Visit Diagnoses       Chest pain, unspecified type        Relevant Orders    Case Request Cath Lab: Left Heart Cath, No LV (Completed)         Patient has been admitted to the hospital and monitored on telemetry.  He is currently in normal sinus rhythm.    Blood pressure is significantly elevated and I advised increasing carvedilol to 25 mg twice daily and adding amlodipine 10 mg daily.    Started aspirin 81 mg daily and ticagrelor 90 mg twice daily after loading with ticagrelor 180 mg.       Check echocardiogram.  Schedule coronary angiography on Monday,  revascularization is needed.    All catheterization films reviewed and in 2019 he underwent double vessel coronary intervention on the circumflex and right coronary artery.  He has significant coronary ectasia and diffuse coronary artery disease.    For his hyperlipidemia, unable to tolerate statins, dose of evolocumab today and then every 2 weeks thereafter Target LDL less than 70.  Can add ezetimibe 10 mg daily to the regimen to further optimize LDL levels.    Continue levothyroxine for hypothyroidism.    Continue losartan for hypertension, upon discharge can change to olmesartan 40 mg daily.    Continue diabetic diet, sliding scale insulin, semaglutide, just pharmacotherapy to achieve hemoglobin A1c less than 7.    Strongly urged to quit tobacco use on a permanent basis    When recovered from acute coronary syndrome enroll in cardiac rehab advised continued exercise program weight loss.    Old records reviewed.    Case discussed with nursing staff.    Peripheral IV 04/20/24 20 G Left Antecubital (Active)   Site Assessment Clean;Dry;Intact 04/20/24 0900   Dressing Status Clean;Dry;Occlusive 04/20/24 0900   Number of days: 0       Code Status:  Full Code    I spent 30 minutes in the professional and overall care of this patient.        Mikael Mendoza MD

## 2024-04-20 NOTE — Clinical Note
Angioplasty of the distal right coronary artery lesion. Inflation 1: Pressure = 24 maninder; Duration = 40 sec. Inflation 2: Pressure = 24 maninder; Duration = 30 sec.

## 2024-04-20 NOTE — Clinical Note
Angioplasty of the distal right coronary artery lesion. Inflation 1: Pressure = 24 maninder; Duration = 28 sec. Inflation 2: Pressure = 24 maninder; Duration = 23 sec.

## 2024-04-20 NOTE — ED PROVIDER NOTES
CC: Chest Pain     HPI:  Patient is a 58-year-old male with a history of insulin-dependent diabetes, hypertension, coronary artery disease, hyperlipidemia, elevated BMI, tobacco use disorder complicated by COPD, hypothyroidism, obstructive sleep apnea and depression with anxiety who presents to the emergency department chest pain.  Patient took 1 baby aspirin prior to arrival.  He states his chest pain feels the exact same as his MI back in 2016.  He describes as a burning sensation that radiates to his left armpit.  Follows with Dr. Youssef.  Has no associated shortness of breath.  No sick symptoms including fevers, chills, cough, congestion, abdominal pain, nausea or vomiting.  Not on anticoagulation.  No longer on dual antiplatelet therapy and only takes baby aspirin on daily basis.    Records Reviewed:  Recent available ED and inpatient notes reviewed in EMR.    PMHx/PSHx:  Per HPI.   - has a past medical history of Body mass index (BMI)40.0-44.9, adult, Morbid (severe) obesity due to excess calories (Multi), Obesity, unspecified, Personal history of other diseases of the circulatory system, Personal history of other diseases of the musculoskeletal system and connective tissue, and Personal history of other diseases of the respiratory system.  - has a past surgical history that includes Other surgical history (06/02/2014).  - has Vasomotor rhinitis; Bariatric surgery status; CAD (coronary artery disease); COPD (chronic obstructive pulmonary disease) (Multi); COPD exacerbation (Multi); Depression with anxiety; Diabetes mellitus type 2 in obese; Erectile dysfunction; Frequent PVCs; Helicobacter pylori infection; Hyperlipidemia; Hypertension, uncontrolled; Hypothyroid; Kidney calculi; Low HDL (under 40); Mild vitamin D deficiency; Myalgia; Nicotine dependence; Obstructive sleep apnea of adult; Scrotal sebaceous cyst; Sinusitis; Sleep apnea; Sleep related hypoventilation in conditions classified elsewhere; STEMI  (ST elevation myocardial infarction) (Multi); Morbid obesity with BMI of 45.0-49.9, adult (Multi); Other secondary chronic gout, unspecified site, without tophus (tophi); Hypokalemia; Annual physical exam; and NSTEMI (non-ST elevated myocardial infarction) (Multi) on their problem list.    Medications:  Reviewed in EMR. See EMR for complete list of medications and doses.    Allergies:  Patient has no known allergies.    Social History:  - Tobacco:  reports that he has quit smoking. His smoking use included cigarettes. He has never used smokeless tobacco.   - Alcohol:  reports no history of alcohol use.   - Illicit Drugs:  reports no history of drug use.     ROS:  Per HPI.       ???????????????????????????????????????????????????????????????  Triage Vitals:  T 36.6 °C (97.9 °F)  HR 92  BP (!) 197/122  RR 18  O2 100 % None (Room air)    Physical Exam  ???????????????????????????????????????????????????????????????  GEN: no acute distress  HEAD: atraumatic  EYES: PERRL, EOMI, no scleral icterus  ENT: mmm  NECK: no JVD  CVS/CHEST: reg rate, nl rhythm  PULM: CTA b/l no wheezes, crackles, or rhonchi   GI: soft, NT/ND, no rebound or guarding   BACK: no CVA tenderness, no vertebral point tenderness  EXT: bilateral nonpitting LE edema, 2+ periph pulses in bilat radial and DP   NEURO: Awake and alert, Strength and sensation is equal in b/l upper and lower extremities, normal ambulation  SKIN: warm, dry  PSYCH: AAOx3 answers questions appropriately    EKG:  EKG read by me reviewed by me is normal sinus rhythm at 90 bpm.  Normal axis.  Normal intervals.  No significant ST segment elevation or depression.  T wave inversions in lateral leads are new from prior in 2019    Assessment and Plan:  Patient presents to the emergency department with chest pain.  On arrival, patient is initially hemodynamically stable.  Initial EKG is nonischemic.  Given the character of patient's chest pain patient given 243 mg of chewable aspirin as  he took 81mg prior to arrival.  Patient has a moderate risk heart score of 6.  Serial EKGs and troponins will be obtained given the concern for acute coronary syndrome.  Based on patient's history and physical, I do have a lower suspicion for other etiologies of chest pain including aortic dissection, given the character of the pain with equal peripheral pulses in upper and lower extremities.  I do have a lower suspicion for pulmonary embolism given lack of risk factors, no clinical signs of DVT, normal heart rate and is saturating appropriately on room air.  EKG without signs of pericarditis. On exam patient does have bilateral breath sounds.  Low suspicion for pneumothorax.  No history of vomiting and therefore low suspicion for esophageal rupture.  Patient's exam and history does appear concerning for acute coronary syndrome.  Pending initial work-up patient will be offered observation for provocative testing, serial labs and cardiology consultation. Dr. Youssef on consult.  Serial EKGs did not have inferior T wave inversions that were new.  These are dynamic changes but no ST segment elevation.  Placed on a heparin drip for an NSTEMI.  Cardiology notified.  Admitted to medicine.      ED Course:  ED Course as of 04/21/24 0258   Sat Apr 20, 2024   0353 Troponin I, High Sensitivity(!): 24 [HD]   0355 Troponin slightly elevated.  Will start heparin drip for NSTEMI given that symptoms started just 1 hour prior to arrival and his story is very concerning. [HD]   0432 Repeat EKG read by me reviewed by me is normal sinus rhythm at 79 bpm.  Normal axis.  Normal intervals however does have new T wave inversions inferiorly with normalization of prior inferior T waves in lateral leads.  Does not meet STEMI criteria but does have an acute change.  On heparin drip. [HD]   0519 Troponin I, High Sensitivity(!!): 53 [HD]   0537 Repeat EKG read by me reviewed by me is normal sinus rhythm at 81 bpm.  Normal axis.  Relatively  unchanged when compared to prior EKG there are T wave inversions in lead II and aVF but does not meet acute ST segment elevation criteria. [HD]      ED Course User Index  [HD] Sydni Crowley DO         Diagnoses as of 04/21/24 0258   Chest pain, unspecified type   NSTEMI (non-ST elevated myocardial infarction) (Multi)       Social Determinants Limiting Care:  None identified    Disposition:  Admitted     Sydni Crowley DO      Procedures ? [employer] last updated 4/21/2024 2:58 AM        Sydni Crowley DO  04/20/24 0521       Sydni Crowley DO  04/21/24 0258

## 2024-04-20 NOTE — NURSING NOTE
08:58 am: Blood pressure 178/123 heart rate 81 PRN labetalol 10mg given.     09:30 am: /106,  HR 79    09:58 am: /109 , HR 80. Dr. Youssef aware. Amlodipine 10mg ordered.     11:18 am: /109 heart rate 82.     13:09 /106 heart rate 78. Dr. Youssef aware and stated no PRN or other interventions at this time. Patient aware as well.

## 2024-04-20 NOTE — Clinical Note
Angioplasty of the distal right coronary artery lesion. Inflation 1: Pressure = 12 maninder; Duration = 20 sec. Inflation 2: Pressure = 12 maninder; Duration = 10 sec.

## 2024-04-20 NOTE — PROGRESS NOTES
04/20/24 0718   Discharge Planning   Living Arrangements Spouse/significant other   Support Systems Spouse/significant other   Assistance Needed A&OX3; independent with ADLs with no DME; drives; room air baseline and currently room air   Type of Residence Private residence   Number of Stairs to Enter Residence 0   Number of Stairs Within Residence 0   Do you have animals or pets at home? Yes   Type of Animals or Pets 1 dog 1 cat   Who is requesting discharge planning? Provider   Patient expects to be discharged to: home no needs pending cardiology workup   Does the patient need discharge transport arranged? No   Financial Resource Strain   How hard is it for you to pay for the very basics like food, housing, medical care, and heating? Not hard   Housing Stability   In the last 12 months, was there a time when you were not able to pay the mortgage or rent on time? N   In the last 12 months, how many places have you lived? 1   In the last 12 months, was there a time when you did not have a steady place to sleep or slept in a shelter (including now)? N   Transportation Needs   In the past 12 months, has lack of transportation kept you from medical appointments or from getting medications? no   In the past 12 months, has lack of transportation kept you from meetings, work, or from getting things needed for daily living? No

## 2024-04-20 NOTE — Clinical Note
Angioplasty of the distal right coronary artery lesion. Inflation 1: Pressure = 24 maninder; Duration = 40 sec. Inflation 2: Pressure = 24 maninder; Duration = 24 sec. Inflation 3: Pressure = 24 maninder; Duration = 24 sec.

## 2024-04-21 LAB
ANION GAP SERPL CALC-SCNC: 14 MMOL/L (ref 10–20)
BUN SERPL-MCNC: 12 MG/DL (ref 6–23)
CALCIUM SERPL-MCNC: 9 MG/DL (ref 8.6–10.3)
CARDIAC TROPONIN I PNL SERPL HS: 513 NG/L (ref 0–20)
CHLORIDE SERPL-SCNC: 104 MMOL/L (ref 98–107)
CO2 SERPL-SCNC: 23 MMOL/L (ref 21–32)
CREAT SERPL-MCNC: 0.97 MG/DL (ref 0.5–1.3)
EGFRCR SERPLBLD CKD-EPI 2021: >90 ML/MIN/1.73M*2
ERYTHROCYTE [DISTWIDTH] IN BLOOD BY AUTOMATED COUNT: 13.4 % (ref 11.5–14.5)
GLUCOSE BLD MANUAL STRIP-MCNC: 112 MG/DL (ref 74–99)
GLUCOSE BLD MANUAL STRIP-MCNC: 133 MG/DL (ref 74–99)
GLUCOSE BLD MANUAL STRIP-MCNC: 197 MG/DL (ref 74–99)
GLUCOSE BLD MANUAL STRIP-MCNC: 206 MG/DL (ref 74–99)
GLUCOSE SERPL-MCNC: 156 MG/DL (ref 74–99)
HCT VFR BLD AUTO: 39.9 % (ref 41–52)
HGB BLD-MCNC: 13.2 G/DL (ref 13.5–17.5)
MAGNESIUM SERPL-MCNC: 1.85 MG/DL (ref 1.6–2.4)
MCH RBC QN AUTO: 10.1 PG (ref 26–34)
MCHC RBC AUTO-ENTMCNC: 33.1 G/DL (ref 32–36)
MCV RBC AUTO: 91 FL (ref 80–100)
NRBC BLD-RTO: 0 /100 WBCS (ref 0–0)
PLATELET # BLD AUTO: 255 X10*3/UL (ref 150–450)
PMV BLD AUTO: 10.2 FL (ref 7.5–11.5)
POTASSIUM SERPL-SCNC: 3.3 MMOL/L (ref 3.5–5.3)
RBC # BLD AUTO: 4.39 X10*6/UL (ref 4.5–5.9)
SODIUM SERPL-SCNC: 138 MMOL/L (ref 136–145)
UFH PPP CHRO-ACNC: 0.3 IU/ML
WBC # BLD AUTO: 10.3 X10*3/UL (ref 4.4–11.3)

## 2024-04-21 PROCEDURE — 82947 ASSAY GLUCOSE BLOOD QUANT: CPT

## 2024-04-21 PROCEDURE — 36415 COLL VENOUS BLD VENIPUNCTURE: CPT | Performed by: STUDENT IN AN ORGANIZED HEALTH CARE EDUCATION/TRAINING PROGRAM

## 2024-04-21 PROCEDURE — 2500000001 HC RX 250 WO HCPCS SELF ADMINISTERED DRUGS (ALT 637 FOR MEDICARE OP): Performed by: NURSE PRACTITIONER

## 2024-04-21 PROCEDURE — 85520 HEPARIN ASSAY: CPT | Performed by: STUDENT IN AN ORGANIZED HEALTH CARE EDUCATION/TRAINING PROGRAM

## 2024-04-21 PROCEDURE — 2500000001 HC RX 250 WO HCPCS SELF ADMINISTERED DRUGS (ALT 637 FOR MEDICARE OP): Performed by: STUDENT IN AN ORGANIZED HEALTH CARE EDUCATION/TRAINING PROGRAM

## 2024-04-21 PROCEDURE — 2500000001 HC RX 250 WO HCPCS SELF ADMINISTERED DRUGS (ALT 637 FOR MEDICARE OP): Performed by: INTERNAL MEDICINE

## 2024-04-21 PROCEDURE — 1200000002 HC GENERAL ROOM WITH TELEMETRY DAILY

## 2024-04-21 PROCEDURE — 2500000006 HC RX 250 W HCPCS SELF ADMINISTERED DRUGS (ALT 637 FOR ALL PAYERS)

## 2024-04-21 PROCEDURE — 2500000004 HC RX 250 GENERAL PHARMACY W/ HCPCS (ALT 636 FOR OP/ED): Performed by: STUDENT IN AN ORGANIZED HEALTH CARE EDUCATION/TRAINING PROGRAM

## 2024-04-21 PROCEDURE — 84484 ASSAY OF TROPONIN QUANT: CPT | Performed by: STUDENT IN AN ORGANIZED HEALTH CARE EDUCATION/TRAINING PROGRAM

## 2024-04-21 PROCEDURE — 99233 SBSQ HOSP IP/OBS HIGH 50: CPT | Performed by: STUDENT IN AN ORGANIZED HEALTH CARE EDUCATION/TRAINING PROGRAM

## 2024-04-21 PROCEDURE — 83735 ASSAY OF MAGNESIUM: CPT | Performed by: STUDENT IN AN ORGANIZED HEALTH CARE EDUCATION/TRAINING PROGRAM

## 2024-04-21 PROCEDURE — 82374 ASSAY BLOOD CARBON DIOXIDE: CPT | Performed by: STUDENT IN AN ORGANIZED HEALTH CARE EDUCATION/TRAINING PROGRAM

## 2024-04-21 PROCEDURE — 85027 COMPLETE CBC AUTOMATED: CPT | Performed by: STUDENT IN AN ORGANIZED HEALTH CARE EDUCATION/TRAINING PROGRAM

## 2024-04-21 PROCEDURE — 2500000006 HC RX 250 W HCPCS SELF ADMINISTERED DRUGS (ALT 637 FOR ALL PAYERS): Performed by: NURSE PRACTITIONER

## 2024-04-21 PROCEDURE — 2500000002 HC RX 250 W HCPCS SELF ADMINISTERED DRUGS (ALT 637 FOR MEDICARE OP, ALT 636 FOR OP/ED): Performed by: STUDENT IN AN ORGANIZED HEALTH CARE EDUCATION/TRAINING PROGRAM

## 2024-04-21 RX ORDER — POTASSIUM CHLORIDE 20 MEQ/1
40 TABLET, EXTENDED RELEASE ORAL ONCE
Status: COMPLETED | OUTPATIENT
Start: 2024-04-21 | End: 2024-04-21

## 2024-04-21 RX ORDER — PROCHLORPERAZINE EDISYLATE 5 MG/ML
5 INJECTION INTRAMUSCULAR; INTRAVENOUS ONCE
Status: COMPLETED | OUTPATIENT
Start: 2024-04-21 | End: 2024-04-21

## 2024-04-21 RX ORDER — MAGNESIUM SULFATE HEPTAHYDRATE 40 MG/ML
2 INJECTION, SOLUTION INTRAVENOUS ONCE
Status: COMPLETED | OUTPATIENT
Start: 2024-04-21 | End: 2024-04-21

## 2024-04-21 RX ORDER — ACETAMINOPHEN 325 MG/1
975 TABLET ORAL ONCE
Status: COMPLETED | OUTPATIENT
Start: 2024-04-21 | End: 2024-04-21

## 2024-04-21 RX ORDER — KETOROLAC TROMETHAMINE 15 MG/ML
15 INJECTION, SOLUTION INTRAMUSCULAR; INTRAVENOUS ONCE
Status: COMPLETED | OUTPATIENT
Start: 2024-04-21 | End: 2024-04-21

## 2024-04-21 RX ADMIN — LOSARTAN POTASSIUM 100 MG: 50 TABLET, FILM COATED ORAL at 08:54

## 2024-04-21 RX ADMIN — LEVOTHYROXINE SODIUM 75 MCG: 75 TABLET ORAL at 06:07

## 2024-04-21 RX ADMIN — KETOROLAC TROMETHAMINE 15 MG: 15 INJECTION, SOLUTION INTRAMUSCULAR; INTRAVENOUS at 09:56

## 2024-04-21 RX ADMIN — MAGNESIUM SULFATE HEPTAHYDRATE 2 G: 2 INJECTION, SOLUTION INTRAVENOUS at 09:56

## 2024-04-21 RX ADMIN — BUPROPION HYDROCHLORIDE 150 MG: 150 TABLET, EXTENDED RELEASE ORAL at 06:06

## 2024-04-21 RX ADMIN — HEPARIN SODIUM 1700 UNITS/HR: 10000 INJECTION, SOLUTION INTRAVENOUS at 22:40

## 2024-04-21 RX ADMIN — Medication 125 MCG: at 05:33

## 2024-04-21 RX ADMIN — POTASSIUM CHLORIDE 40 MEQ: 1500 TABLET, EXTENDED RELEASE ORAL at 12:50

## 2024-04-21 RX ADMIN — Medication 1 TABLET: at 08:54

## 2024-04-21 RX ADMIN — TOPIRAMATE 50 MG: 25 TABLET, FILM COATED ORAL at 21:20

## 2024-04-21 RX ADMIN — AMLODIPINE BESYLATE 10 MG: 10 TABLET ORAL at 08:53

## 2024-04-21 RX ADMIN — TIOTROPIUM BROMIDE INHALATION SPRAY 2 PUFF: 3.12 SPRAY, METERED RESPIRATORY (INHALATION) at 06:07

## 2024-04-21 RX ADMIN — TICAGRELOR 90 MG: 90 TABLET ORAL at 08:54

## 2024-04-21 RX ADMIN — HEPARIN SODIUM 1700 UNITS/HR: 10000 INJECTION, SOLUTION INTRAVENOUS at 06:02

## 2024-04-21 RX ADMIN — INSULIN LISPRO 2 UNITS: 100 INJECTION, SOLUTION INTRAVENOUS; SUBCUTANEOUS at 08:52

## 2024-04-21 RX ADMIN — CARVEDILOL 25 MG: 25 TABLET, FILM COATED ORAL at 08:53

## 2024-04-21 RX ADMIN — ACETAMINOPHEN 975 MG: 325 TABLET ORAL at 09:56

## 2024-04-21 RX ADMIN — CARVEDILOL 25 MG: 25 TABLET, FILM COATED ORAL at 17:53

## 2024-04-21 RX ADMIN — TICAGRELOR 90 MG: 90 TABLET ORAL at 21:20

## 2024-04-21 RX ADMIN — PROCHLORPERAZINE EDISYLATE 5 MG: 5 INJECTION INTRAMUSCULAR; INTRAVENOUS at 09:56

## 2024-04-21 RX ADMIN — ASPIRIN 81 MG 81 MG: 81 TABLET ORAL at 08:53

## 2024-04-21 RX ADMIN — INSULIN LISPRO 4 UNITS: 100 INJECTION, SOLUTION INTRAVENOUS; SUBCUTANEOUS at 12:49

## 2024-04-21 RX ADMIN — TOPIRAMATE 50 MG: 25 TABLET, FILM COATED ORAL at 08:54

## 2024-04-21 RX ADMIN — IBUPROFEN 400 MG: 400 TABLET, FILM COATED ORAL at 06:02

## 2024-04-21 ASSESSMENT — COGNITIVE AND FUNCTIONAL STATUS - GENERAL
DAILY ACTIVITIY SCORE: 24
MOBILITY SCORE: 24

## 2024-04-21 ASSESSMENT — PAIN DESCRIPTION - DESCRIPTORS
DESCRIPTORS: ACHING
DESCRIPTORS: ACHING;POUNDING

## 2024-04-21 ASSESSMENT — PAIN SCALES - GENERAL
PAINLEVEL_OUTOF10: 5 - MODERATE PAIN
PAINLEVEL_OUTOF10: 4
PAINLEVEL_OUTOF10: 0 - NO PAIN

## 2024-04-21 ASSESSMENT — PAIN - FUNCTIONAL ASSESSMENT
PAIN_FUNCTIONAL_ASSESSMENT: 0-10
PAIN_FUNCTIONAL_ASSESSMENT: 0-10

## 2024-04-21 NOTE — PROGRESS NOTES
"    [facility]  Department of Hospital Medicine    PROGRESS NOTE    Lyle Oh is a 58 y.o. male on day 1 of admission presenting with NSTEMI (non-ST elevated myocardial infarction) (Multi).    Subjective   Still having slight on and off chest pain.  Primarily bothered by recurrent headache.       Objective     Physical Exam:  Con: awake, alert; mild distress;   Eyes: conjunctiva wnl; EOMI;   ENMT: hearing intact; MMM;   MSK: ROM wnl; digits wnl; trace bilateral lower extremity pitting edema  Resp: normal work of breathing; no cyanosis;   Neuro: moving all extremities; no abnormal movements  Psych: oriented to situation; affect wnl;     Last Recorded Vitals:  BP (!) 142/91 (BP Location: Right arm, Patient Position: Sitting)   Pulse 80   Temp 36.3 °C (97.3 °F) (Temporal)   Resp 17   Ht 1.803 m (5' 11\")   Wt 142 kg (312 lb 9.8 oz)   SpO2 97%   BMI 43.60 kg/m²      Scheduled medications:  amLODIPine, 10 mg, oral, Daily  aspirin, 81 mg, oral, Daily  atorvastatin, 80 mg, oral, Nightly  buPROPion XL, 150 mg, oral, Daily before breakfast  carvedilol, 25 mg, oral, BID with meals  cholecalciferol, 5,000 Units, oral, Every Day  docusate sodium, 100 mg, oral, BID  evolocumab, 140 mg, subcutaneous, q14 days  [Held by provider] fenofibrate, 108 mg, oral, Daily  insulin lispro, 0-10 Units, subcutaneous, TID with meals  levothyroxine, 75 mcg, oral, Daily before breakfast  losartan, 100 mg, oral, Daily  multivitamin with minerals, 1 tablet, oral, Daily  oxygen, , inhalation, Continuous - Inhalation  perflutren lipid microspheres, 0.5-10 mL of dilution, intravenous, Once in imaging  perflutren protein A microsphere, 0.5 mL, intravenous, Once in imaging  potassium chloride CR, 40 mEq, oral, Once  prochlorperazine, 10 mg, oral, Once   Or  prochlorperazine, 10 mg, intravenous, Once   Or  prochlorperazine, 25 mg, rectal, Once  sulfur hexafluoride microsphr, 2 mL, intravenous, Once in " imaging  ticagrelor, 90 mg, oral, BID  tiotropium, 2 puff, inhalation, Daily  topiramate, 50 mg, oral, BID      Continuous medications:  heparin, 0-4,000 Units/hr, Last Rate: 1,700 Units/hr (04/21/24 0602)      PRN medications:  PRN medications: albuterol, dextrose, dextrose, glucagon, glucagon, heparin, ibuprofen, labetaloL, nitroglycerin     Relevant Results:  Lab Results   Component Value Date    WBC 10.3 04/21/2024    HGB 13.2 (L) 04/21/2024    HCT 39.9 (L) 04/21/2024    MCV 91 04/21/2024     04/21/2024      Lab Results   Component Value Date    GLUCOSE 156 (H) 04/21/2024    CALCIUM 9.0 04/21/2024     04/21/2024    K 3.3 (L) 04/21/2024    CO2 23 04/21/2024     04/21/2024    BUN 12 04/21/2024    CREATININE 0.97 04/21/2024                Assessment/Plan   Principal Problem:    NSTEMI (non-ST elevated myocardial infarction) (Multi)    Lyle Oh is a 58 y.o. male with a pertinent medical history of morbid obesity, insulin-dependent diabetes, hypothyroidism, obstructive sleep apnea, depression, hypertension, nicotine use disorder, hyperlipidemia not on statin, CAD with coronary stent placement x 3 with last stent being placed 2019 who presented to [facility] ER overnight with complaint of midsternal LCW non reproducible chest pain that awoke him out of his sleep. Pertinent workup in the ER included: Hypertension to 197/122, EKG that showed new T wave depressions in leads III and aVF, troponin 24, 53, 132.  He was admitted for NSTEMI.    NSTEMI:  - cardiology consulted; planning coronary angiography Monday  - Low intensity heparin infusion  - ASA, ticagrelor, Repatha per cardiology  - NTG prn  - telemetry  - echo  - serial troponins until downtrending  - trend BMP, CBC    Hypertensive urgency:  -Labetalol as needed  -Uptitrating amlodipine, carvedilol, losartan    DM2:  - trend fingerstick glucose; inpatient goal 140-180  - holding oral hypoglycemics  - sliding scale insulin  - hypoglycemia  protocol    Headache:  -As needed ibuprofen  -Toradol, magnesium, Compazine, acetaminophen given x 2 with improvement; can be repeated  -Continue topiramate    Hypothyroidism:  - levothyroxine    STEVEN:  -CPAP    COPD:  -Spiriva, albuterol as needed    DVT PPx: On heparin    Dispo: Inpatient, likely 2 more days           Andrez Nichols MD

## 2024-04-21 NOTE — PROGRESS NOTES
Lyle Oh is a 58 y.o. male on day 1 of admission presenting with NSTEMI (non-ST elevated myocardial infarction) (Multi).      Subjective     Lyle was laying in bed, complaining of chest discomfort and headache since he was given nitroglycerin. He is currently NSR on tele.     Review of systems:  Constitutional: negative for fever, chills, or malaise  Neuro: negative for dizziness, headache, numbness, tingling  ENT: Negative for nasal congestion or sore throat  Resp: negative for shortness of breath, cough, or wheezing  CV: positive for chest pain, palpitations  GI: negative for abd pain, nausea, vomiting or diarrhea  : negative for dysuria, frequency, or urgency  Skin: negative for lesions, wounds, or rash  Musculoskeletal: Negative for weakness, myalgia, or arthralgia  Endocrine: Negative for polyuria or polydipsia         Objective   Constitutional: Well developed, awake/alert/oriented x3, no distress, alert and cooperative  Eyes: PERRL, EOMI, clear sclera  ENMT: mucous membranes moist, no apparent injury, no lesions seen  Head/Neck: Neck supple, no apparent injury, thyroid without mass or tenderness, No JVD, trachea midline, no bruits  Respiratory/Thorax: Patent airways, CTAB, normal breath sounds with good chest expansion, thorax symmetric  Cardiovascular: Regular, rate and rhythm, Grade 3/6 LSB murmurs, 2+ equal pulses of the extremities, normal S 1and S 2  Gastrointestinal: Nondistended, soft, non-tender, no rebound tenderness or guarding, no masses palpable, no organomegaly, +BS, no bruits  Musculoskeletal: ROM intact, no joint swelling, normal strength  Extremities: normal extremities, no cyanosis edema, contusions or wounds, no clubbing  Neurological: alert and oriented x3, intact senses, motor, response and reflexes, normal strength  Lymphatic: No significant lymphadenopathy  Psychological: Appropriate mood and behavior  Skin: Warm and dry, no lesions, no rashes      Last Recorded  "Vitals  /86 (BP Location: Right arm, Patient Position: Lying)   Pulse 78   Temp 36.2 °C (97.2 °F) (Temporal)   Resp 18   Ht 1.803 m (5' 11\")   Wt 142 kg (312 lb 9.8 oz)   SpO2 97%   BMI 43.60 kg/m²     Intake/Output last 3 Shifts:  I/O last 3 completed shifts:  In: 780 (5.5 mL/kg) [P.O.:780]  Out: - (0 mL/kg)   Weight: 141.8 kg   No intake/output data recorded.    Relevant Results  Scheduled medications  amLODIPine, 10 mg, oral, Daily  aspirin, 81 mg, oral, Daily  atorvastatin, 80 mg, oral, Nightly  buPROPion XL, 150 mg, oral, Daily before breakfast  carvedilol, 25 mg, oral, BID with meals  cholecalciferol, 5,000 Units, oral, Every Day  docusate sodium, 100 mg, oral, BID  evolocumab, 140 mg, subcutaneous, q14 days  [Held by provider] fenofibrate, 108 mg, oral, Daily  insulin lispro, 0-10 Units, subcutaneous, TID with meals  levothyroxine, 75 mcg, oral, Daily before breakfast  losartan, 100 mg, oral, Daily  multivitamin with minerals, 1 tablet, oral, Daily  oxygen, , inhalation, Continuous - Inhalation  perflutren lipid microspheres, 0.5-10 mL of dilution, intravenous, Once in imaging  perflutren protein A microsphere, 0.5 mL, intravenous, Once in imaging  prochlorperazine, 10 mg, oral, Once   Or  prochlorperazine, 10 mg, intravenous, Once   Or  prochlorperazine, 25 mg, rectal, Once  sulfur hexafluoride microsphr, 2 mL, intravenous, Once in imaging  ticagrelor, 90 mg, oral, BID  tiotropium, 2 puff, inhalation, Daily  topiramate, 50 mg, oral, BID      Continuous medications  heparin, 0-4,000 Units/hr, Last Rate: 1,700 Units/hr (04/21/24 0602)      PRN medications  PRN medications: albuterol, dextrose, dextrose, glucagon, glucagon, heparin, ibuprofen, labetaloL, nitroglycerin    Results for orders placed or performed during the hospital encounter of 04/20/24 (from the past 24 hour(s))   Heparin Assay   Result Value Ref Range    Heparin Unfractionated 0.2 See Comment Below for Therapeutic Ranges IU/mL "   POCT GLUCOSE   Result Value Ref Range    POCT Glucose 133 (H) 74 - 99 mg/dL   Heparin Assay, [facility]   Result Value Ref Range    Heparin Unfractionated 0.4 See Comment Below for Therapeutic Ranges IU/mL   Heparin Assay, [facility]   Result Value Ref Range    Heparin Unfractionated 0.3 See Comment Below for Therapeutic Ranges IU/mL   Troponin I, High Sensitivity   Result Value Ref Range    Troponin I, High Sensitivity 513 (HH) 0 - 20 ng/L   Basic Metabolic Panel   Result Value Ref Range    Glucose 156 (H) 74 - 99 mg/dL    Sodium 138 136 - 145 mmol/L    Potassium 3.3 (L) 3.5 - 5.3 mmol/L    Chloride 104 98 - 107 mmol/L    Bicarbonate 23 21 - 32 mmol/L    Anion Gap 14 10 - 20 mmol/L    Urea Nitrogen 12 6 - 23 mg/dL    Creatinine 0.97 0.50 - 1.30 mg/dL    eGFR >90 >60 mL/min/1.73m*2    Calcium 9.0 8.6 - 10.3 mg/dL   Magnesium   Result Value Ref Range    Magnesium 1.85 1.60 - 2.40 mg/dL   CBC   Result Value Ref Range    WBC 10.3 4.4 - 11.3 x10*3/uL    nRBC 0.0 0.0 - 0.0 /100 WBCs    RBC 4.39 (L) 4.50 - 5.90 x10*6/uL    Hemoglobin 13.2 (L) 13.5 - 17.5 g/dL    Hematocrit 39.9 (L) 41.0 - 52.0 %    MCV 91 80 - 100 fL    MCH 10.1 (L) 26.0 - 34.0 pg    MCHC 33.1 32.0 - 36.0 g/dL    RDW 13.4 11.5 - 14.5 %    Platelets 255 150 - 450 x10*3/uL    MPV 10.2 7.5 - 11.5 fL   POCT GLUCOSE   Result Value Ref Range    POCT Glucose 197 (H) 74 - 99 mg/dL   POCT GLUCOSE   Result Value Ref Range    POCT Glucose 206 (H) 74 - 99 mg/dL   POCT GLUCOSE   Result Value Ref Range    POCT Glucose 112 (H) 74 - 99 mg/dL       XR chest 1 view   Final Result   No airspace consolidation or pleural effusion.        MACRO:   None        Signed by: Fareed Mason 4/20/2024 3:58 AM   Dictation workstation:   [id number]      Transthoracic Echo (TTE) Complete    (Results Pending)       No echocardiogram results found for the past 12 months         Assessment/Plan   Principal Problem:    NSTEMI (non-ST elevated myocardial infarction) (Multi)    Patient has been admitted  to the hospital and monitored on telemetry.  He is currently in normal sinus rhythm.     Blood pressure is significantly elevated and I advised increasing carvedilol to 25 mg twice daily and adding amlodipine 10 mg daily.     Started aspirin 81 mg daily and ticagrelor 90 mg twice daily after loading with ticagrelor 180 mg.        Check echocardiogram.  Schedule coronary angiography on Monday, revascularization is needed.     All catheterization films reviewed and in 2019 he underwent double vessel coronary intervention on the circumflex and right coronary artery.  He has significant coronary ectasia and diffuse coronary artery disease.     For his hyperlipidemia, unable to tolerate statins, dose of evolocumab today and then every 2 weeks thereafter Target LDL less than 70.  Can add ezetimibe 10 mg daily to the regimen to further optimize LDL levels.     Continue levothyroxine for hypothyroidism.     Continue losartan for hypertension, upon discharge can change to olmesartan 40 mg daily.     Continue diabetic diet, sliding scale insulin, semaglutide, just pharmacotherapy to achieve hemoglobin A1c less than 7.     Strongly urged to quit tobacco use on a permanent basis     When recovered from acute coronary syndrome enroll in cardiac rehab advised continued exercise program weight loss.     Old records reviewed.     Case discussed with nursing staff.      Christine Hernandez, APRN-CNP

## 2024-04-21 NOTE — CARE PLAN
The patient's goals for the shift include  patient will be able to use call light to ask for assistance during this shift     The clinical goals for the shift include pain control      Problem: Pain  Goal: My pain/discomfort is manageable  Outcome: Progressing     Problem: Safety  Goal: Patient will be injury free during hospitalization  Outcome: Progressing     Problem: Daily Care  Goal: Daily care needs are met  Outcome: Progressing

## 2024-04-22 ENCOUNTER — APPOINTMENT (OUTPATIENT)
Dept: CARDIOLOGY | Facility: HOSPITAL | Age: 59
DRG: 322 | End: 2024-04-22
Payer: COMMERCIAL

## 2024-04-22 PROBLEM — R07.9 CHEST PAIN: Status: ACTIVE | Noted: 2024-04-20

## 2024-04-22 LAB
ANION GAP SERPL CALC-SCNC: 13 MMOL/L (ref 10–20)
AORTIC VALVE MEAN GRADIENT: 11.2 MMHG
AORTIC VALVE PEAK VELOCITY: 2.21 M/S
AV PEAK GRADIENT: 19.6 MMHG
AVA (PEAK VEL): 1.41 CM2
AVA (VTI): 1.47 CM2
BUN SERPL-MCNC: 15 MG/DL (ref 6–23)
CALCIUM SERPL-MCNC: 9 MG/DL (ref 8.6–10.3)
CHLORIDE SERPL-SCNC: 103 MMOL/L (ref 98–107)
CO2 SERPL-SCNC: 25 MMOL/L (ref 21–32)
CREAT SERPL-MCNC: 1.13 MG/DL (ref 0.5–1.3)
EGFRCR SERPLBLD CKD-EPI 2021: 76 ML/MIN/1.73M*2
EJECTION FRACTION APICAL 4 CHAMBER: 56.4
ERYTHROCYTE [DISTWIDTH] IN BLOOD BY AUTOMATED COUNT: 13.2 % (ref 11.5–14.5)
GLUCOSE BLD MANUAL STRIP-MCNC: 153 MG/DL (ref 74–99)
GLUCOSE BLD MANUAL STRIP-MCNC: 184 MG/DL (ref 74–99)
GLUCOSE BLD MANUAL STRIP-MCNC: 187 MG/DL (ref 74–99)
GLUCOSE SERPL-MCNC: 186 MG/DL (ref 74–99)
HCT VFR BLD AUTO: 39.7 % (ref 41–52)
HGB BLD-MCNC: 13 G/DL (ref 13.5–17.5)
LEFT ATRIUM VOLUME AREA LENGTH INDEX BSA: 21 ML/M2
LEFT VENTRICLE INTERNAL DIMENSION DIASTOLE: 6.77 CM (ref 3.5–6)
LEFT VENTRICULAR OUTFLOW TRACT DIAMETER: 2.21 CM
LV EJECTION FRACTION BIPLANE: 58 %
MAGNESIUM SERPL-MCNC: 1.97 MG/DL (ref 1.6–2.4)
MCH RBC QN AUTO: 29.8 PG (ref 26–34)
MCHC RBC AUTO-ENTMCNC: 32.7 G/DL (ref 32–36)
MCV RBC AUTO: 91 FL (ref 80–100)
MITRAL VALVE E/A RATIO: 0.65
MITRAL VALVE E/E' RATIO: 6.1
NRBC BLD-RTO: 0 /100 WBCS (ref 0–0)
PLATELET # BLD AUTO: 251 X10*3/UL (ref 150–450)
POTASSIUM SERPL-SCNC: 3.6 MMOL/L (ref 3.5–5.3)
RBC # BLD AUTO: 4.36 X10*6/UL (ref 4.5–5.9)
RIGHT VENTRICLE FREE WALL PEAK S': 14 CM/S
RIGHT VENTRICLE PEAK SYSTOLIC PRESSURE: 22.7 MMHG
SODIUM SERPL-SCNC: 137 MMOL/L (ref 136–145)
TRICUSPID ANNULAR PLANE SYSTOLIC EXCURSION: 2.3 CM
UFH PPP CHRO-ACNC: 0.3 IU/ML
WBC # BLD AUTO: 10.9 X10*3/UL (ref 4.4–11.3)

## 2024-04-22 PROCEDURE — 93458 L HRT ARTERY/VENTRICLE ANGIO: CPT | Performed by: INTERNAL MEDICINE

## 2024-04-22 PROCEDURE — 2550000001 HC RX 255 CONTRASTS: Performed by: INTERNAL MEDICINE

## 2024-04-22 PROCEDURE — C1769 GUIDE WIRE: HCPCS | Performed by: INTERNAL MEDICINE

## 2024-04-22 PROCEDURE — 85027 COMPLETE CBC AUTOMATED: CPT | Performed by: STUDENT IN AN ORGANIZED HEALTH CARE EDUCATION/TRAINING PROGRAM

## 2024-04-22 PROCEDURE — 82947 ASSAY GLUCOSE BLOOD QUANT: CPT

## 2024-04-22 PROCEDURE — 80048 BASIC METABOLIC PNL TOTAL CA: CPT | Performed by: STUDENT IN AN ORGANIZED HEALTH CARE EDUCATION/TRAINING PROGRAM

## 2024-04-22 PROCEDURE — 2500000004 HC RX 250 GENERAL PHARMACY W/ HCPCS (ALT 636 FOR OP/ED): Performed by: INTERNAL MEDICINE

## 2024-04-22 PROCEDURE — 2780000003 HC OR 278 NO HCPCS: Performed by: INTERNAL MEDICINE

## 2024-04-22 PROCEDURE — 99153 MOD SED SAME PHYS/QHP EA: CPT | Performed by: INTERNAL MEDICINE

## 2024-04-22 PROCEDURE — 99152 MOD SED SAME PHYS/QHP 5/>YRS: CPT | Performed by: INTERNAL MEDICINE

## 2024-04-22 PROCEDURE — 93010 ELECTROCARDIOGRAM REPORT: CPT | Performed by: INTERNAL MEDICINE

## 2024-04-22 PROCEDURE — 93306 TTE W/DOPPLER COMPLETE: CPT

## 2024-04-22 PROCEDURE — 2500000002 HC RX 250 W HCPCS SELF ADMINISTERED DRUGS (ALT 637 FOR MEDICARE OP, ALT 636 FOR OP/ED): Performed by: STUDENT IN AN ORGANIZED HEALTH CARE EDUCATION/TRAINING PROGRAM

## 2024-04-22 PROCEDURE — 2500000001 HC RX 250 WO HCPCS SELF ADMINISTERED DRUGS (ALT 637 FOR MEDICARE OP): Performed by: STUDENT IN AN ORGANIZED HEALTH CARE EDUCATION/TRAINING PROGRAM

## 2024-04-22 PROCEDURE — 2720000007 HC OR 272 NO HCPCS: Performed by: INTERNAL MEDICINE

## 2024-04-22 PROCEDURE — 36415 COLL VENOUS BLD VENIPUNCTURE: CPT | Performed by: STUDENT IN AN ORGANIZED HEALTH CARE EDUCATION/TRAINING PROGRAM

## 2024-04-22 PROCEDURE — C9600 PERC DRUG-EL COR STENT SING: HCPCS | Performed by: INTERNAL MEDICINE

## 2024-04-22 PROCEDURE — 85520 HEPARIN ASSAY: CPT | Performed by: STUDENT IN AN ORGANIZED HEALTH CARE EDUCATION/TRAINING PROGRAM

## 2024-04-22 PROCEDURE — 85347 COAGULATION TIME ACTIVATED: CPT | Performed by: INTERNAL MEDICINE

## 2024-04-22 PROCEDURE — 2500000001 HC RX 250 WO HCPCS SELF ADMINISTERED DRUGS (ALT 637 FOR MEDICARE OP): Performed by: INTERNAL MEDICINE

## 2024-04-22 PROCEDURE — C1887 CATHETER, GUIDING: HCPCS | Performed by: INTERNAL MEDICINE

## 2024-04-22 PROCEDURE — 2500000001 HC RX 250 WO HCPCS SELF ADMINISTERED DRUGS (ALT 637 FOR MEDICARE OP): Performed by: NURSE PRACTITIONER

## 2024-04-22 PROCEDURE — C1725 CATH, TRANSLUMIN NON-LASER: HCPCS | Performed by: INTERNAL MEDICINE

## 2024-04-22 PROCEDURE — 92928 PRQ TCAT PLMT NTRAC ST 1 LES: CPT | Performed by: INTERNAL MEDICINE

## 2024-04-22 PROCEDURE — C1894 INTRO/SHEATH, NON-LASER: HCPCS | Performed by: INTERNAL MEDICINE

## 2024-04-22 PROCEDURE — 2500000006 HC RX 250 W HCPCS SELF ADMINISTERED DRUGS (ALT 637 FOR ALL PAYERS): Performed by: NURSE PRACTITIONER

## 2024-04-22 PROCEDURE — 2060000001 HC INTERMEDIATE ICU ROOM DAILY

## 2024-04-22 PROCEDURE — 99233 SBSQ HOSP IP/OBS HIGH 50: CPT | Performed by: STUDENT IN AN ORGANIZED HEALTH CARE EDUCATION/TRAINING PROGRAM

## 2024-04-22 PROCEDURE — C1874 STENT, COATED/COV W/DEL SYS: HCPCS | Performed by: INTERNAL MEDICINE

## 2024-04-22 PROCEDURE — 93005 ELECTROCARDIOGRAM TRACING: CPT

## 2024-04-22 PROCEDURE — 83735 ASSAY OF MAGNESIUM: CPT | Performed by: STUDENT IN AN ORGANIZED HEALTH CARE EDUCATION/TRAINING PROGRAM

## 2024-04-22 PROCEDURE — 7100000009 HC PHASE TWO TIME - INITIAL BASE CHARGE: Performed by: INTERNAL MEDICINE

## 2024-04-22 PROCEDURE — 2500000005 HC RX 250 GENERAL PHARMACY W/O HCPCS: Performed by: INTERNAL MEDICINE

## 2024-04-22 PROCEDURE — 7100000010 HC PHASE TWO TIME - EACH INCREMENTAL 1 MINUTE: Performed by: INTERNAL MEDICINE

## 2024-04-22 DEVICE — STENT ONYXNG30018UX ONYX 3.00X18RX
Type: IMPLANTABLE DEVICE | Site: HEART | Status: FUNCTIONAL
Brand: ONYX FRONTIER™

## 2024-04-22 RX ORDER — EZETIMIBE 10 MG/1
10 TABLET ORAL NIGHTLY
Status: DISCONTINUED | OUTPATIENT
Start: 2024-04-22 | End: 2024-04-23 | Stop reason: HOSPADM

## 2024-04-22 RX ORDER — VERAPAMIL HYDROCHLORIDE 2.5 MG/ML
INJECTION, SOLUTION INTRAVENOUS AS NEEDED
Status: DISCONTINUED | OUTPATIENT
Start: 2024-04-22 | End: 2024-04-22 | Stop reason: HOSPADM

## 2024-04-22 RX ORDER — HEPARIN SODIUM 1000 [USP'U]/ML
INJECTION, SOLUTION INTRAVENOUS; SUBCUTANEOUS AS NEEDED
Status: DISCONTINUED | OUTPATIENT
Start: 2024-04-22 | End: 2024-04-22 | Stop reason: HOSPADM

## 2024-04-22 RX ORDER — FENTANYL CITRATE 50 UG/ML
INJECTION, SOLUTION INTRAMUSCULAR; INTRAVENOUS AS NEEDED
Status: DISCONTINUED | OUTPATIENT
Start: 2024-04-22 | End: 2024-04-22 | Stop reason: HOSPADM

## 2024-04-22 RX ORDER — MIDAZOLAM HYDROCHLORIDE 1 MG/ML
INJECTION, SOLUTION INTRAMUSCULAR; INTRAVENOUS AS NEEDED
Status: DISCONTINUED | OUTPATIENT
Start: 2024-04-22 | End: 2024-04-22 | Stop reason: HOSPADM

## 2024-04-22 RX ORDER — NITROGLYCERIN 40 MG/100ML
INJECTION INTRAVENOUS AS NEEDED
Status: DISCONTINUED | OUTPATIENT
Start: 2024-04-22 | End: 2024-04-22 | Stop reason: HOSPADM

## 2024-04-22 RX ORDER — LIDOCAINE HYDROCHLORIDE 20 MG/ML
INJECTION, SOLUTION INFILTRATION; PERINEURAL AS NEEDED
Status: DISCONTINUED | OUTPATIENT
Start: 2024-04-22 | End: 2024-04-22 | Stop reason: HOSPADM

## 2024-04-22 RX ADMIN — ASPIRIN 81 MG 81 MG: 81 TABLET ORAL at 09:37

## 2024-04-22 RX ADMIN — CARVEDILOL 25 MG: 25 TABLET, FILM COATED ORAL at 16:48

## 2024-04-22 RX ADMIN — TOPIRAMATE 50 MG: 25 TABLET, FILM COATED ORAL at 09:37

## 2024-04-22 RX ADMIN — LOSARTAN POTASSIUM 100 MG: 50 TABLET, FILM COATED ORAL at 09:39

## 2024-04-22 RX ADMIN — INSULIN LISPRO 2 UNITS: 100 INJECTION, SOLUTION INTRAVENOUS; SUBCUTANEOUS at 16:47

## 2024-04-22 RX ADMIN — Medication 1 TABLET: at 09:38

## 2024-04-22 RX ADMIN — Medication 125 MCG: at 06:00

## 2024-04-22 RX ADMIN — TIOTROPIUM BROMIDE INHALATION SPRAY 2 PUFF: 3.12 SPRAY, METERED RESPIRATORY (INHALATION) at 06:00

## 2024-04-22 RX ADMIN — LEVOTHYROXINE SODIUM 75 MCG: 75 TABLET ORAL at 06:00

## 2024-04-22 RX ADMIN — BUPROPION HYDROCHLORIDE 150 MG: 150 TABLET, EXTENDED RELEASE ORAL at 06:00

## 2024-04-22 RX ADMIN — AMLODIPINE BESYLATE 10 MG: 10 TABLET ORAL at 09:40

## 2024-04-22 RX ADMIN — CARVEDILOL 25 MG: 25 TABLET, FILM COATED ORAL at 09:40

## 2024-04-22 RX ADMIN — INSULIN LISPRO 2 UNITS: 100 INJECTION, SOLUTION INTRAVENOUS; SUBCUTANEOUS at 09:34

## 2024-04-22 RX ADMIN — TICAGRELOR 90 MG: 90 TABLET ORAL at 20:38

## 2024-04-22 RX ADMIN — TICAGRELOR 90 MG: 90 TABLET ORAL at 09:39

## 2024-04-22 ASSESSMENT — COGNITIVE AND FUNCTIONAL STATUS - GENERAL
MOBILITY SCORE: 24
DAILY ACTIVITIY SCORE: 24
MOBILITY SCORE: 24
DAILY ACTIVITIY SCORE: 24

## 2024-04-22 ASSESSMENT — PAIN SCALES - GENERAL
PAINLEVEL_OUTOF10: 0 - NO PAIN

## 2024-04-22 ASSESSMENT — ACTIVITIES OF DAILY LIVING (ADL): LACK_OF_TRANSPORTATION: NO

## 2024-04-22 NOTE — PROGRESS NOTES
Lyle Oh is a 58 y.o. male on day 2 of admission presenting with NSTEMI (non-ST elevated myocardial infarction) (Multi).      Subjective   He is sitting up in the bed, now status post left heart catheterization s/p PCI/WAGNER to distal left circumflex.  States his chest feels sore.    Review of systems:  Constitutional: negative for fever, chills, or malaise  Neuro: negative for dizziness, headache, numbness, tingling  ENT: Negative for nasal congestion or sore throat  Resp: negative for shortness of breath, cough, or wheezing  GI: negative for abd pain, nausea, vomiting or diarrhea  : negative for dysuria, frequency, or urgency  Skin: negative for lesions, wounds, or rash  Musculoskeletal: Negative for weakness, myalgia, or arthralgia  Endocrine: Negative for polyuria or polydipsia         Objective   Constitutional: Well developed, awake/alert/oriented x3, no distress, alert and cooperative  Eyes: PERRL, EOMI, clear sclera  ENMT: mucous membranes moist, no apparent injury, no lesions seen  Head/Neck: Neck supple, no apparent injury, thyroid without mass or tenderness, No JVD, trachea midline, no bruits  Respiratory/Thorax: Patent airways, CTAB, normal breath sounds with good chest expansion, thorax symmetric  Cardiovascular: Regular, rate and rhythm, Grade 1/6 LSB murmur, 2+ equal pulses of the extremities, normal S 1and S 2  Gastrointestinal: Nondistended, soft, non-tender, no rebound tenderness or guarding, no masses palpable, no organomegaly, +BS, no bruits  Musculoskeletal: ROM intact, no joint swelling, normal strength  Extremities: normal extremities, no cyanosis edema, contusions or wounds, no clubbing  Neurological: alert and oriented x3, intact senses, motor, response and reflexes, normal strength  Lymphatic: No significant lymphadenopathy  Psychological: Appropriate mood and behavior  Skin: Warm and dry, cath site to right wrist with TR band in place      Last Recorded Vitals  BP (!) 129/95 (BP  "Location: Left arm, Patient Position: Standing)   Pulse 76   Temp 36.4 °C (97.5 °F) (Temporal)   Resp 18   Ht 1.803 m (5' 11\")   Wt 142 kg (312 lb 9.8 oz)   SpO2 95%   BMI 43.60 kg/m²     Intake/Output last 3 Shifts:  No intake/output data recorded.  I/O this shift:  In: 150 [I.V.:150]  Out: 5 [Blood:5]    Relevant Results  Scheduled medications  amLODIPine, 10 mg, oral, Daily  aspirin, 81 mg, oral, Daily  atorvastatin, 80 mg, oral, Nightly  buPROPion XL, 150 mg, oral, Daily before breakfast  carvedilol, 25 mg, oral, BID with meals  cholecalciferol, 5,000 Units, oral, Every Day  docusate sodium, 100 mg, oral, BID  evolocumab, 140 mg, subcutaneous, q14 days  [Held by provider] fenofibrate, 108 mg, oral, Daily  insulin lispro, 0-10 Units, subcutaneous, TID with meals  levothyroxine, 75 mcg, oral, Daily before breakfast  losartan, 100 mg, oral, Daily  multivitamin with minerals, 1 tablet, oral, Daily  oxygen, , inhalation, Continuous - Inhalation  perflutren lipid microspheres, 0.5-10 mL of dilution, intravenous, Once in imaging  prochlorperazine, 10 mg, oral, Once   Or  prochlorperazine, 10 mg, intravenous, Once   Or  prochlorperazine, 25 mg, rectal, Once  ticagrelor, 90 mg, oral, BID  tiotropium, 2 puff, inhalation, Daily  topiramate, 50 mg, oral, BID      Continuous medications  heparin, 0-4,000 Units/hr, Last Rate: Stopped (04/22/24 1038)      PRN medications  PRN medications: albuterol, dextrose, dextrose, glucagon, glucagon, heparin, ibuprofen, labetaloL, nitroglycerin    Results for orders placed or performed during the hospital encounter of 04/20/24 (from the past 24 hour(s))   POCT GLUCOSE   Result Value Ref Range    POCT Glucose 133 (H) 74 - 99 mg/dL   Basic Metabolic Panel   Result Value Ref Range    Glucose 186 (H) 74 - 99 mg/dL    Sodium 137 136 - 145 mmol/L    Potassium 3.6 3.5 - 5.3 mmol/L    Chloride 103 98 - 107 mmol/L    Bicarbonate 25 21 - 32 mmol/L    Anion Gap 13 10 - 20 mmol/L    Urea " Nitrogen 15 6 - 23 mg/dL    Creatinine 1.13 0.50 - 1.30 mg/dL    eGFR 76 >60 mL/min/1.73m*2    Calcium 9.0 8.6 - 10.3 mg/dL   Magnesium   Result Value Ref Range    Magnesium 1.97 1.60 - 2.40 mg/dL   Heparin Assay, UFH   Result Value Ref Range    Heparin Unfractionated 0.3 See Comment Below for Therapeutic Ranges IU/mL   CBC   Result Value Ref Range    WBC 10.9 4.4 - 11.3 x10*3/uL    nRBC 0.0 0.0 - 0.0 /100 WBCs    RBC 4.36 (L) 4.50 - 5.90 x10*6/uL    Hemoglobin 13.0 (L) 13.5 - 17.5 g/dL    Hematocrit 39.7 (L) 41.0 - 52.0 %    MCV 91 80 - 100 fL    MCH 29.8 26.0 - 34.0 pg    MCHC 32.7 32.0 - 36.0 g/dL    RDW 13.2 11.5 - 14.5 %    Platelets 251 150 - 450 x10*3/uL   POCT GLUCOSE   Result Value Ref Range    POCT Glucose 187 (H) 74 - 99 mg/dL   Transthoracic Echo (TTE) Complete   Result Value Ref Range    AV pk shanita 2.21 m/s    AV mn grad 11.2 mmHg    LVOT diam 2.21 cm    LV Biplane EF 58 %    MV avg E/e' ratio 6.10     MV E/A ratio 0.65     LA vol index A/L 21.0 ml/m2    Tricuspid annular plane systolic excursion 2.3 cm    RV free wall pk S' 14.00 cm/s    LVIDd 6.77 cm    RVSP 22.7 mmHg    Aortic Valve Area by Continuity of VTI 1.47 cm2    Aortic Valve Area by Continuity of Peak Velocity 1.41 cm2    AV pk grad 19.6 mmHg    LV A4C EF 56.4        Transthoracic Echo (TTE) Complete   Final Result      XR chest 1 view   Final Result   No airspace consolidation or pleural effusion.        MACRO:   None        Signed by: Fareed Mason 4/20/2024 3:58 AM   Dictation workstation:   [id number]      Cardiac Catheterization Procedure    (Results Pending)       Transthoracic Echo (TTE) Complete    Result Date: 4/22/2024   [facility], [address] [place] [address]               Tel [phone] and Fax [phone] TRANSTHORACIC ECHOCARDIOGRAM REPORT  Patient Name:      PATSY BARAJAS  Reading Physician:    [id number] Renay Larose MD Study Date:         4/22/2024            Ordering Provider:    [id number] DEVAN CROWE MRN/PID:           [id number]             Fellow: Accession#:        [id number]         Nurse: Date of Birth/Age: 1965 / 58      Sonographer:          Jenniferyaa Oropeza                    diana JOHNSCS Gender:            M                    Additional Staff: Height:            180.34 cm            Admit Date:           4/20/2024 Weight:            141.52 kg            Admission Status:     Inpatient -                                                               Routine BSA / BMI:         2.55 m2 / 43.51      Encounter#:           [id number]                    kg/m2                                         Department Location:  [facility]                                                               Invasive Blood Pressure: 127 /76 mmHg Study Type:    TRANSTHORACIC ECHO (TTE) COMPLETE Diagnosis/ICD: Non ST elevation (NSTEMI) myocardial infarction-I21.4 Indication:    NSTEMI CPT Code:      Echo Complete w Full [id number] Patient History: Pertinent History: Chest Pain and Elev. troponins. Study Detail: The following Echo studies were performed: 2D, M-Mode, Doppler and               color flow. The patient was awake.  PHYSICIAN INTERPRETATION: Left Ventricle: The left ventricular systolic function is normal, with an estimated ejection fraction of 55-60%. There are no regional wall motion abnormalities. The left ventricular cavity size is normal. Spectral Doppler shows an impaired relaxation pattern of left ventricular diastolic filling. Left Atrium: The left atrium is normal in size. Right Ventricle: The right ventricle is normal in size. There is normal right ventricular global systolic function. Right Atrium: The right atrium is normal in size. Aortic Valve: The aortic valve is trileaflet. There is mild aortic valve cusp calcification. There is evidence of mild aortic valve stenosis. There is trivial aortic  valve regurgitation. The peak instantaneous gradient of the aortic valve is 19.6 mmHg. The mean gradient of the aortic valve is 11.2 mmHg. Mitral Valve: The mitral valve is normal in structure. There is trace to mild mitral valve regurgitation. Tricuspid Valve: The tricuspid valve is structurally normal. There is trace tricuspid regurgitation. Pulmonic Valve: The pulmonic valve is not well visualized. The pulmonic valve regurgitation was not well visualized. Pericardium: There is no pericardial effusion noted. Aorta: The aortic root is normal. Pulmonary Artery: The tricuspid regurgitant velocity is 2.22 m/s, and with an estimated right atrial pressure of 3 mmHg, the estimated pulmonary artery pressure is normal with the RVSP at 22.7 mmHg.  CONCLUSIONS:  1. Left ventricular systolic function is normal with a 55-60% estimated ejection fraction.  2. Spectral Doppler shows an impaired relaxation pattern of left ventricular diastolic filling.  3. Mild aortic valve stenosis. QUANTITATIVE DATA SUMMARY: 2D MEASUREMENTS:                           Normal Ranges: IVSd:          1.16 cm    (0.6-1.1cm) LVPWd:         1.32 cm    (0.6-1.1cm) LVIDd:         6.77 cm    (3.9-5.9cm) LVIDs:         4.27 cm LV Mass Index: 157.2 g/m2 LV % FS        37.0 % LA VOLUME:                               Normal Ranges: LA Vol A4C:        41.9 ml    (22+/-6mL/m2) LA Vol A2C:        67.4 ml LA Vol BP:         53.6 ml LA Vol Index A4C:  16.5 ml/m2 LA Vol Index A2C:  26.5 ml/m2 LA Vol Index BP:   21.0 ml/m2 LA Area A4C:       17.2 cm2 LA Area A2C:       22.0 cm2 LA Major Axis A4C: 6.0 cm LA Major Axis A2C: 6.1 cm LA Volume Index:   21.1 ml/m2 LA Vol A4C:        37.9 ml LA Vol A2C:        60.4 ml RA VOLUME BY A/L METHOD:                       Normal Ranges: RA Area A4C: 17.0 cm2 LV SYSTOLIC FUNCTION BY 2D PLANIMETRY (MOD):                     Normal Ranges: EF-A4C View: 56.4 % (>=55%) EF-A2C View: 60.5 % EF-Biplane:  57.9 % LV DIASTOLIC FUNCTION:                            Normal Ranges: MV Peak E:    0.43 m/s    (0.7-1.2 m/s) MV Peak A:    0.66 m/s    (0.42-0.7 m/s) E/A Ratio:    0.65        (1.0-2.2) MV e'         0.07 m/s    (>8.0) MV lateral e' 0.09 m/s MV medial e'  0.07 m/s MV A Dur:     117.03 msec E/e' Ratio:   6.10        (<8.0) MITRAL VALVE:                 Normal Ranges: MV DT: 253 msec (150-240msec) AORTIC VALVE:                                    Normal Ranges: AoV Vmax:                2.21 m/s  (<=1.7m/s) AoV Peak P.6 mmHg (<20mmHg) AoV Mean P.2 mmHg (1.7-11.5mmHg) LVOT Max Watson:            0.81 m/s  (<=1.1m/s) AoV VTI:                 42.17 cm  (18-25cm) LVOT VTI:                16.08 cm LVOT Diameter:           2.21 cm   (1.8-2.4cm) AoV Area, VTI:           1.47 cm2  (2.5-5.5cm2) AoV Area,Vmax:           1.41 cm2  (2.5-4.5cm2) AoV Dimensionless Index: 0.38  RIGHT VENTRICLE: RV Basal 3.90 cm RV Mid   3.20 cm RV Major 9.1 cm TAPSE:   23.0 mm RV s'    0.14 m/s TRICUSPID VALVE/RVSP:                             Normal Ranges: Peak TR Velocity: 2.22 m/s RV Syst Pressure: 22.7 mmHg (< 30mmHg) IVC Diam:         1.50 cm PULMONIC VALVE:                      Normal Ranges: PV Max Watson: 1.2 m/s  (0.6-0.9m/s) PV Max P.8 mmHg AORTA: Asc Ao Diam 3.52 cm  [id number] Renay Larose MD Electronically signed on 2024 at 2:08:47 PM  ** Final **           Assessment/Plan   Principal Problem:    NSTEMI (non-ST elevated myocardial infarction) (Multi)  Active Problems:    Hypertension, uncontrolled    Chest pain    Patient has been admitted to the hospital and monitored on telemetry.  He is currently in normal sinus rhythm.    Now s/p C today s/p PCI/WAGNER to distal LCX.      For his Hypertension, cont carvedilol 25 mg twice daily and amlodipine 10 mg daily.     Cont aspirin 81 mg daily and ticagrelor 90 mg twice daily after loading with ticagrelor 180 mg.      Echocardiogram as above.     All catheterization films reviewed and in 2019 he  underwent double vessel coronary intervention on the circumflex and right coronary artery.  He has significant coronary ectasia and diffuse coronary artery disease.     For his hyperlipidemia, unable to tolerate statins, Repatha started every 2 weeks- Target LDL less than 70.  Add ezetimibe 10 mg daily to the regimen to further optimize LDL levels.     Continue levothyroxine for hypothyroidism.     Continue losartan for hypertension, upon discharge can change to olmesartan 40 mg daily.     Continue diabetic diet, sliding scale insulin, semaglutide, just pharmacotherapy to achieve hemoglobin A1c less than 7.     Strongly urged to quit tobacco use on a permanent basis     When recovered from acute coronary syndrome enroll in cardiac rehab advised continued exercise program weight loss.     Old records reviewed.        Karon Lam, APRN-CNP

## 2024-04-22 NOTE — PROGRESS NOTES
04/22/24 0824   Discharge Planning   Living Arrangements Spouse/significant other   Support Systems Spouse/significant other   Assistance Needed A&OX3; independent with ADLs with no DME; drives; room air baseline and currently room air   Type of Residence Private residence   Number of Stairs to Enter Residence 0   Number of Stairs Within Residence 0   Do you have animals or pets at home? Yes   Type of Animals or Pets 1 dog 1 cat   Who is requesting discharge planning? Provider   Patient expects to be discharged to: home no needs- pending heart cath 4/22/24   Financial Resource Strain   How hard is it for you to pay for the very basics like food, housing, medical care, and heating? Not hard   Housing Stability   In the last 12 months, was there a time when you were not able to pay the mortgage or rent on time? N   In the last 12 months, how many places have you lived? 1   In the last 12 months, was there a time when you did not have a steady place to sleep or slept in a shelter (including now)? N   Transportation Needs   In the past 12 months, has lack of transportation kept you from medical appointments or from getting medications? no   In the past 12 months, has lack of transportation kept you from meetings, work, or from getting things needed for daily living? No

## 2024-04-22 NOTE — PROGRESS NOTES
"    [facility]  Department of Hospital Medicine    PROGRESS NOTE    Lyle Oh is a 58 y.o. male on day 2 of admission presenting with NSTEMI (non-ST elevated myocardial infarction) (Multi).    Subjective   No chest pain, no headache.        Objective     Physical Exam:  Con: awake, alert; no distress;   Eyes: conjunctiva wnl; EOMI;   ENMT: hearing intact; MMM;   MSK: ROM wnl; digits wnl; trace bilateral lower extremity pitting edema  Resp: normal work of breathing; no cyanosis;   Neuro: moving all extremities; no abnormal movements  Psych: oriented to situation; affect wnl;     Last Recorded Vitals:  /84 (BP Location: Left arm, Patient Position: Standing)   Pulse 83   Temp 36.4 °C (97.5 °F) (Temporal)   Resp 18   Ht 1.803 m (5' 11\")   Wt 142 kg (312 lb 9.8 oz)   SpO2 95%   BMI 43.60 kg/m²      Scheduled medications:  amLODIPine, 10 mg, oral, Daily  aspirin, 81 mg, oral, Daily  atorvastatin, 80 mg, oral, Nightly  buPROPion XL, 150 mg, oral, Daily before breakfast  carvedilol, 25 mg, oral, BID with meals  cholecalciferol, 5,000 Units, oral, Every Day  docusate sodium, 100 mg, oral, BID  evolocumab, 140 mg, subcutaneous, q14 days  [Held by provider] fenofibrate, 108 mg, oral, Daily  insulin lispro, 0-10 Units, subcutaneous, TID with meals  levothyroxine, 75 mcg, oral, Daily before breakfast  losartan, 100 mg, oral, Daily  multivitamin with minerals, 1 tablet, oral, Daily  oxygen, , inhalation, Continuous - Inhalation  perflutren lipid microspheres, 0.5-10 mL of dilution, intravenous, Once in imaging  prochlorperazine, 10 mg, oral, Once   Or  prochlorperazine, 10 mg, intravenous, Once   Or  prochlorperazine, 25 mg, rectal, Once  ticagrelor, 90 mg, oral, BID  tiotropium, 2 puff, inhalation, Daily  topiramate, 50 mg, oral, BID      Continuous medications:  heparin, 0-4,000 Units/hr, Last Rate: Stopped (04/22/24 1038)      PRN medications:  PRN medications: " albuterol, dextrose, dextrose, glucagon, glucagon, heparin, ibuprofen, labetaloL, nitroglycerin     Relevant Results:  Lab Results   Component Value Date    WBC 10.9 04/22/2024    HGB 13.0 (L) 04/22/2024    HCT 39.7 (L) 04/22/2024    MCV 91 04/22/2024     04/22/2024      Lab Results   Component Value Date    GLUCOSE 186 (H) 04/22/2024    CALCIUM 9.0 04/22/2024     04/22/2024    K 3.6 04/22/2024    CO2 25 04/22/2024     04/22/2024    BUN 15 04/22/2024    CREATININE 1.13 04/22/2024                Assessment/Plan   Principal Problem:    NSTEMI (non-ST elevated myocardial infarction) (Multi)  Active Problems:    Hypertension, uncontrolled    Chest pain    Lyle Oh is a 58 y.o. male with a pertinent medical history of morbid obesity, insulin-dependent diabetes, hypothyroidism, obstructive sleep apnea, depression, hypertension, nicotine use disorder, hyperlipidemia not on statin, CAD with coronary stent placement x 3 with last stent being placed 2019 who presented to [facility] ER overnight with complaint of midsternal LCW non reproducible chest pain that awoke him out of his sleep. Pertinent workup in the ER included: Hypertension to 197/122, EKG that showed new T wave depressions in leads III and aVF, troponin 24, 53, 132.  He was admitted for NSTEMI.    NSTEMI:  - cardiology consulted; planning coronary angiography Monday  - Low intensity heparin infusion  - ASA, ticagrelor, Repatha per cardiology  - NTG prn  - telemetry  - echo  - serial troponins until downtrending  - trend BMP, CBC    Hypertensive urgency:  -Labetalol as needed  -Uptitrating amlodipine, carvedilol, losartan    DM2:  - trend fingerstick glucose; inpatient goal 140-180  - holding oral hypoglycemics  - sliding scale insulin  - hypoglycemia protocol    Headache:  -As needed ibuprofen  -Toradol, magnesium, Compazine, acetaminophen given x 2 with improvement; can be repeated  -Continue topiramate    Hypothyroidism:  -  levothyroxine    STEVEN:  -CPAP    COPD:  -Spiriva, albuterol as needed    DVT PPx: On heparin    Dispo: Inpatient, likely 1 more day           Andrez Nichols MD

## 2024-04-23 VITALS
OXYGEN SATURATION: 98 % | RESPIRATION RATE: 17 BRPM | HEART RATE: 83 BPM | TEMPERATURE: 96.8 F | DIASTOLIC BLOOD PRESSURE: 80 MMHG | WEIGHT: 315 LBS | BODY MASS INDEX: 44.1 KG/M2 | HEIGHT: 71 IN | SYSTOLIC BLOOD PRESSURE: 111 MMHG

## 2024-04-23 PROBLEM — I10 HYPERTENSION, UNCONTROLLED: Status: RESOLVED | Noted: 2023-01-28 | Resolved: 2024-04-23

## 2024-04-23 PROBLEM — I21.4 NSTEMI (NON-ST ELEVATED MYOCARDIAL INFARCTION) (MULTI): Status: RESOLVED | Noted: 2024-04-20 | Resolved: 2024-04-23

## 2024-04-23 PROBLEM — R07.9 CHEST PAIN: Status: RESOLVED | Noted: 2024-04-20 | Resolved: 2024-04-23

## 2024-04-23 LAB
ANION GAP SERPL CALC-SCNC: 11 MMOL/L (ref 10–20)
BUN SERPL-MCNC: 16 MG/DL (ref 6–23)
CALCIUM SERPL-MCNC: 9.4 MG/DL (ref 8.6–10.3)
CHLORIDE SERPL-SCNC: 103 MMOL/L (ref 98–107)
CO2 SERPL-SCNC: 26 MMOL/L (ref 21–32)
CREAT SERPL-MCNC: 1.25 MG/DL (ref 0.5–1.3)
EGFRCR SERPLBLD CKD-EPI 2021: 68 ML/MIN/1.73M*2
ERYTHROCYTE [DISTWIDTH] IN BLOOD BY AUTOMATED COUNT: 13.2 % (ref 11.5–14.5)
GLUCOSE BLD MANUAL STRIP-MCNC: 132 MG/DL (ref 74–99)
GLUCOSE BLD MANUAL STRIP-MCNC: 172 MG/DL (ref 74–99)
GLUCOSE SERPL-MCNC: 148 MG/DL (ref 74–99)
HCT VFR BLD AUTO: 41.8 % (ref 41–52)
HGB BLD-MCNC: 13.7 G/DL (ref 13.5–17.5)
MAGNESIUM SERPL-MCNC: 2 MG/DL (ref 1.6–2.4)
MCH RBC QN AUTO: 29.9 PG (ref 26–34)
MCHC RBC AUTO-ENTMCNC: 32.8 G/DL (ref 32–36)
MCV RBC AUTO: 91 FL (ref 80–100)
NRBC BLD-RTO: 0 /100 WBCS (ref 0–0)
PLATELET # BLD AUTO: 269 X10*3/UL (ref 150–450)
POTASSIUM SERPL-SCNC: 3.9 MMOL/L (ref 3.5–5.3)
RBC # BLD AUTO: 4.58 X10*6/UL (ref 4.5–5.9)
SODIUM SERPL-SCNC: 136 MMOL/L (ref 136–145)
WBC # BLD AUTO: 9.6 X10*3/UL (ref 4.4–11.3)

## 2024-04-23 PROCEDURE — 83735 ASSAY OF MAGNESIUM: CPT | Performed by: STUDENT IN AN ORGANIZED HEALTH CARE EDUCATION/TRAINING PROGRAM

## 2024-04-23 PROCEDURE — 2500000001 HC RX 250 WO HCPCS SELF ADMINISTERED DRUGS (ALT 637 FOR MEDICARE OP): Performed by: INTERNAL MEDICINE

## 2024-04-23 PROCEDURE — 82374 ASSAY BLOOD CARBON DIOXIDE: CPT | Performed by: STUDENT IN AN ORGANIZED HEALTH CARE EDUCATION/TRAINING PROGRAM

## 2024-04-23 PROCEDURE — 2500000001 HC RX 250 WO HCPCS SELF ADMINISTERED DRUGS (ALT 637 FOR MEDICARE OP): Performed by: NURSE PRACTITIONER

## 2024-04-23 PROCEDURE — 2500000006 HC RX 250 W HCPCS SELF ADMINISTERED DRUGS (ALT 637 FOR ALL PAYERS): Performed by: NURSE PRACTITIONER

## 2024-04-23 PROCEDURE — 2500000005 HC RX 250 GENERAL PHARMACY W/O HCPCS: Performed by: NURSE PRACTITIONER

## 2024-04-23 PROCEDURE — 99238 HOSP IP/OBS DSCHRG MGMT 30/<: CPT | Performed by: FAMILY MEDICINE

## 2024-04-23 PROCEDURE — 2500000002 HC RX 250 W HCPCS SELF ADMINISTERED DRUGS (ALT 637 FOR MEDICARE OP, ALT 636 FOR OP/ED): Performed by: STUDENT IN AN ORGANIZED HEALTH CARE EDUCATION/TRAINING PROGRAM

## 2024-04-23 PROCEDURE — 82947 ASSAY GLUCOSE BLOOD QUANT: CPT

## 2024-04-23 PROCEDURE — 85027 COMPLETE CBC AUTOMATED: CPT | Performed by: STUDENT IN AN ORGANIZED HEALTH CARE EDUCATION/TRAINING PROGRAM

## 2024-04-23 PROCEDURE — 36415 COLL VENOUS BLD VENIPUNCTURE: CPT | Performed by: STUDENT IN AN ORGANIZED HEALTH CARE EDUCATION/TRAINING PROGRAM

## 2024-04-23 PROCEDURE — 2500000001 HC RX 250 WO HCPCS SELF ADMINISTERED DRUGS (ALT 637 FOR MEDICARE OP): Performed by: STUDENT IN AN ORGANIZED HEALTH CARE EDUCATION/TRAINING PROGRAM

## 2024-04-23 PROCEDURE — RXMED WILLOW AMBULATORY MEDICATION CHARGE

## 2024-04-23 RX ORDER — AMLODIPINE BESYLATE 10 MG/1
10 TABLET ORAL DAILY
Qty: 30 TABLET | Refills: 11 | Status: SHIPPED | OUTPATIENT
Start: 2024-04-24 | End: 2024-07-23

## 2024-04-23 RX ORDER — CARVEDILOL 25 MG/1
25 TABLET ORAL
Qty: 60 TABLET | Refills: 11 | Status: SHIPPED | OUTPATIENT
Start: 2024-04-23 | End: 2024-07-23

## 2024-04-23 RX ORDER — OLMESARTAN MEDOXOMIL 40 MG/1
40 TABLET ORAL DAILY
Qty: 30 TABLET | Refills: 11 | Status: SHIPPED | OUTPATIENT
Start: 2024-04-23 | End: 2024-07-23

## 2024-04-23 RX ORDER — EZETIMIBE 10 MG/1
10 TABLET ORAL NIGHTLY
Qty: 30 TABLET | Refills: 11 | Status: SHIPPED | OUTPATIENT
Start: 2024-04-23 | End: 2024-07-23

## 2024-04-23 RX ADMIN — ASPIRIN 81 MG 81 MG: 81 TABLET ORAL at 09:07

## 2024-04-23 RX ADMIN — LOSARTAN POTASSIUM 100 MG: 50 TABLET, FILM COATED ORAL at 09:07

## 2024-04-23 RX ADMIN — LEVOTHYROXINE SODIUM 75 MCG: 75 TABLET ORAL at 05:51

## 2024-04-23 RX ADMIN — BUPROPION HYDROCHLORIDE 150 MG: 150 TABLET, EXTENDED RELEASE ORAL at 05:51

## 2024-04-23 RX ADMIN — Medication 1 TABLET: at 09:07

## 2024-04-23 RX ADMIN — AMLODIPINE BESYLATE 10 MG: 10 TABLET ORAL at 09:07

## 2024-04-23 RX ADMIN — TICAGRELOR 90 MG: 90 TABLET ORAL at 09:07

## 2024-04-23 RX ADMIN — Medication 125 MCG: at 05:51

## 2024-04-23 RX ADMIN — CARVEDILOL 25 MG: 25 TABLET, FILM COATED ORAL at 09:07

## 2024-04-23 RX ADMIN — Medication: at 08:00

## 2024-04-23 RX ADMIN — INSULIN LISPRO 2 UNITS: 100 INJECTION, SOLUTION INTRAVENOUS; SUBCUTANEOUS at 09:08

## 2024-04-23 RX ADMIN — TOPIRAMATE 50 MG: 25 TABLET, FILM COATED ORAL at 09:07

## 2024-04-23 ASSESSMENT — PAIN SCALES - GENERAL: PAINLEVEL_OUTOF10: 0 - NO PAIN

## 2024-04-23 NOTE — PROGRESS NOTES
Lyle Oh is a 58 y.o. male on day 3 of admission presenting with NSTEMI (non-ST elevated myocardial infarction) (Multi).      Subjective   He is sitting up in the bed, no complaints.     Review of systems:  Constitutional: negative for fever, chills, or malaise  Neuro: negative for dizziness, headache, numbness, tingling  ENT: Negative for nasal congestion or sore throat  Resp: negative for shortness of breath, cough, or wheezing  GI: negative for abd pain, nausea, vomiting or diarrhea  : negative for dysuria, frequency, or urgency  Skin: negative for lesions, wounds, or rash  Musculoskeletal: Negative for weakness, myalgia, or arthralgia  Endocrine: Negative for polyuria or polydipsia         Objective   Constitutional: Well developed, awake/alert/oriented x3, no distress, alert and cooperative  Eyes: PERRL, EOMI, clear sclera  ENMT: mucous membranes moist, no apparent injury, no lesions seen  Head/Neck: Neck supple, no apparent injury, thyroid without mass or tenderness, No JVD, trachea midline, no bruits  Respiratory/Thorax: Patent airways, CTAB, normal breath sounds with good chest expansion, thorax symmetric  Cardiovascular: Regular, rate and rhythm, Grade 1/6 LSB murmur, 2+ equal pulses of the extremities, normal S 1and S 2  Gastrointestinal: Nondistended, soft, non-tender, no rebound tenderness or guarding, no masses palpable, no organomegaly, +BS, no bruits  Musculoskeletal: ROM intact, no joint swelling, normal strength  Extremities: normal extremities, no cyanosis edema, contusions or wounds, no clubbing  Neurological: alert and oriented x3, intact senses, motor, response and reflexes, normal strength  Lymphatic: No significant lymphadenopathy  Psychological: Appropriate mood and behavior  Skin: Warm and dry, cath site to right wrist intact      Last Recorded Vitals  /80 (BP Location: Left arm, Patient Position: Standing)   Pulse 83   Temp 36 °C (96.8 °F) (Temporal)   Resp 17   Ht  "1.803 m (5' 11\")   Wt 148 kg (325 lb 6.4 oz)   SpO2 98%   BMI 45.38 kg/m²     Intake/Output last 3 Shifts:  I/O last 3 completed shifts:  In: 630 (4.3 mL/kg) [P.O.:480; I.V.:150 (1 mL/kg)]  Out: 5 (0 mL/kg) [Blood:5]  Weight: 147.6 kg   I/O this shift:  In: 480 [P.O.:480]  Out: -     Relevant Results  Scheduled medications  amLODIPine, 10 mg, oral, Daily  aspirin, 81 mg, oral, Daily  atorvastatin, 80 mg, oral, Nightly  buPROPion XL, 150 mg, oral, Daily before breakfast  carvedilol, 25 mg, oral, BID with meals  cholecalciferol, 5,000 Units, oral, Every Day  docusate sodium, 100 mg, oral, BID  evolocumab, 140 mg, subcutaneous, q14 days  ezetimibe, 10 mg, oral, Nightly  [Held by provider] fenofibrate, 108 mg, oral, Daily  insulin lispro, 0-10 Units, subcutaneous, TID with meals  levothyroxine, 75 mcg, oral, Daily before breakfast  losartan, 100 mg, oral, Daily  multivitamin with minerals, 1 tablet, oral, Daily  oxygen, , inhalation, Continuous - Inhalation  perflutren lipid microspheres, 0.5-10 mL of dilution, intravenous, Once in imaging  prochlorperazine, 10 mg, oral, Once   Or  prochlorperazine, 10 mg, intravenous, Once   Or  prochlorperazine, 25 mg, rectal, Once  ticagrelor, 90 mg, oral, BID  tiotropium, 2 puff, inhalation, Daily  topiramate, 50 mg, oral, BID      Continuous medications       PRN medications  PRN medications: albuterol, dextrose, dextrose, glucagon, glucagon, ibuprofen, labetaloL, nitroglycerin    Results for orders placed or performed during the hospital encounter of 04/20/24 (from the past 24 hour(s))   POCT GLUCOSE   Result Value Ref Range    POCT Glucose 153 (H) 74 - 99 mg/dL   POCT GLUCOSE   Result Value Ref Range    POCT Glucose 184 (H) 74 - 99 mg/dL   CBC   Result Value Ref Range    WBC 9.6 4.4 - 11.3 x10*3/uL    nRBC 0.0 0.0 - 0.0 /100 WBCs    RBC 4.58 4.50 - 5.90 x10*6/uL    Hemoglobin 13.7 13.5 - 17.5 g/dL    Hematocrit 41.8 41.0 - 52.0 %    MCV 91 80 - 100 fL    MCH 29.9 26.0 - 34.0 " pg    MCHC 32.8 32.0 - 36.0 g/dL    RDW 13.2 11.5 - 14.5 %    Platelets 269 150 - 450 x10*3/uL   Basic Metabolic Panel   Result Value Ref Range    Glucose 148 (H) 74 - 99 mg/dL    Sodium 136 136 - 145 mmol/L    Potassium 3.9 3.5 - 5.3 mmol/L    Chloride 103 98 - 107 mmol/L    Bicarbonate 26 21 - 32 mmol/L    Anion Gap 11 10 - 20 mmol/L    Urea Nitrogen 16 6 - 23 mg/dL    Creatinine 1.25 0.50 - 1.30 mg/dL    eGFR 68 >60 mL/min/1.73m*2    Calcium 9.4 8.6 - 10.3 mg/dL   Magnesium   Result Value Ref Range    Magnesium 2.00 1.60 - 2.40 mg/dL   POCT GLUCOSE   Result Value Ref Range    POCT Glucose 172 (H) 74 - 99 mg/dL   POCT GLUCOSE   Result Value Ref Range    POCT Glucose 132 (H) 74 - 99 mg/dL       Cardiac Catheterization Procedure   Final Result      Transthoracic Echo (TTE) Complete   Final Result      XR chest 1 view   Final Result   No airspace consolidation or pleural effusion.        MACRO:   None        Signed by: Fareed Mason 4/20/2024 3:58 AM   Dictation workstation:   [id number]          Transthoracic Echo (TTE) Complete    Result Date: 4/22/2024   [facility], [address] [place] [address]               Tel [phone] and Fax [phone] TRANSTHORACIC ECHOCARDIOGRAM REPORT  Patient Name:      PATSY Knight Physician:    [id number] Renay Larose MD Study Date:        4/22/2024            Ordering Provider:    [id number] DEVAN CROWE MRN/PID:           [id number]             Fellow: Accession#:        [id number]         Nurse: Date of Birth/Age: 1965 / 58      Sonographer:          Jennifer ortiz                                      RDOMAR Gender:            M                    Additional Staff: Height:            180.34 cm            Admit Date:           4/20/2024 Weight:            141.52 kg            Admission Status:     Inpatient -                                                                Routine BSA / BMI:         2.55 m2 / 43.51      Encounter#:           [id number]                    kg/m2                                         Department Location:  [facility] Non                                                               Invasive Blood Pressure: 127 /76 mmHg Study Type:    TRANSTHORACIC ECHO (TTE) COMPLETE Diagnosis/ICD: Non ST elevation (NSTEMI) myocardial infarction-I21.4 Indication:    NSTEMI CPT Code:      Echo Complete w Full Doppler-[id number] Patient History: Pertinent History: Chest Pain and Elev. troponins. Study Detail: The following Echo studies were performed: 2D, M-Mode, Doppler and               color flow. The patient was awake.  PHYSICIAN INTERPRETATION: Left Ventricle: The left ventricular systolic function is normal, with an estimated ejection fraction of 55-60%. There are no regional wall motion abnormalities. The left ventricular cavity size is normal. Spectral Doppler shows an impaired relaxation pattern of left ventricular diastolic filling. Left Atrium: The left atrium is normal in size. Right Ventricle: The right ventricle is normal in size. There is normal right ventricular global systolic function. Right Atrium: The right atrium is normal in size. Aortic Valve: The aortic valve is trileaflet. There is mild aortic valve cusp calcification. There is evidence of mild aortic valve stenosis. There is trivial aortic valve regurgitation. The peak instantaneous gradient of the aortic valve is 19.6 mmHg. The mean gradient of the aortic valve is 11.2 mmHg. Mitral Valve: The mitral valve is normal in structure. There is trace to mild mitral valve regurgitation. Tricuspid Valve: The tricuspid valve is structurally normal. There is trace tricuspid regurgitation. Pulmonic Valve: The pulmonic valve is not well visualized. The pulmonic valve regurgitation was not well visualized. Pericardium: There is no pericardial effusion noted. Aorta: The aortic root is normal. Pulmonary  Artery: The tricuspid regurgitant velocity is 2.22 m/s, and with an estimated right atrial pressure of 3 mmHg, the estimated pulmonary artery pressure is normal with the RVSP at 22.7 mmHg.  CONCLUSIONS:  1. Left ventricular systolic function is normal with a 55-60% estimated ejection fraction.  2. Spectral Doppler shows an impaired relaxation pattern of left ventricular diastolic filling.  3. Mild aortic valve stenosis. QUANTITATIVE DATA SUMMARY: 2D MEASUREMENTS:                           Normal Ranges: IVSd:          1.16 cm    (0.6-1.1cm) LVPWd:         1.32 cm    (0.6-1.1cm) LVIDd:         6.77 cm    (3.9-5.9cm) LVIDs:         4.27 cm LV Mass Index: 157.2 g/m2 LV % FS        37.0 % LA VOLUME:                               Normal Ranges: LA Vol A4C:        41.9 ml    (22+/-6mL/m2) LA Vol A2C:        67.4 ml LA Vol BP:         53.6 ml LA Vol Index A4C:  16.5 ml/m2 LA Vol Index A2C:  26.5 ml/m2 LA Vol Index BP:   21.0 ml/m2 LA Area A4C:       17.2 cm2 LA Area A2C:       22.0 cm2 LA Major Axis A4C: 6.0 cm LA Major Axis A2C: 6.1 cm LA Volume Index:   21.1 ml/m2 LA Vol A4C:        37.9 ml LA Vol A2C:        60.4 ml RA VOLUME BY A/L METHOD:                       Normal Ranges: RA Area A4C: 17.0 cm2 LV SYSTOLIC FUNCTION BY 2D PLANIMETRY (MOD):                     Normal Ranges: EF-A4C View: 56.4 % (>=55%) EF-A2C View: 60.5 % EF-Biplane:  57.9 % LV DIASTOLIC FUNCTION:                           Normal Ranges: MV Peak E:    0.43 m/s    (0.7-1.2 m/s) MV Peak A:    0.66 m/s    (0.42-0.7 m/s) E/A Ratio:    0.65        (1.0-2.2) MV e'         0.07 m/s    (>8.0) MV lateral e' 0.09 m/s MV medial e'  0.07 m/s MV A Dur:     117.03 msec E/e' Ratio:   6.10        (<8.0) MITRAL VALVE:                 Normal Ranges: MV DT: 253 msec (150-240msec) AORTIC VALVE:                                    Normal Ranges: AoV Vmax:                2.21 m/s  (<=1.7m/s) AoV Peak P.6 mmHg (<20mmHg) AoV Mean P.2 mmHg  (1.7-11.5mmHg) LVOT Max Watson:            0.81 m/s  (<=1.1m/s) AoV VTI:                 42.17 cm  (18-25cm) LVOT VTI:                16.08 cm LVOT Diameter:           2.21 cm   (1.8-2.4cm) AoV Area, VTI:           1.47 cm2  (2.5-5.5cm2) AoV Area,Vmax:           1.41 cm2  (2.5-4.5cm2) AoV Dimensionless Index: 0.38  RIGHT VENTRICLE: RV Basal 3.90 cm RV Mid   3.20 cm RV Major 9.1 cm TAPSE:   23.0 mm RV s'    0.14 m/s TRICUSPID VALVE/RVSP:                             Normal Ranges: Peak TR Velocity: 2.22 m/s RV Syst Pressure: 22.7 mmHg (< 30mmHg) IVC Diam:         1.50 cm PULMONIC VALVE:                      Normal Ranges: PV Max Watson: 1.2 m/s  (0.6-0.9m/s) PV Max P.8 mmHg AORTA: Asc Ao Diam 3.52 cm  [id number] Renay Larose MD Electronically signed on 2024 at 2:08:47 PM  ** Final **           Assessment/Plan   Active Problems:  There are no active Hospital Problems.    Patient has been admitted to the hospital and monitored on telemetry.  He is currently in normal sinus rhythm.    Now s/p C s/p PCI/WAGNER to distal LCX.      For his Hypertension, cont carvedilol 25 mg twice daily and amlodipine 10 mg daily, Continue losartan, upon discharge can change to olmesartan 40 mg daily.     Cont aspirin 81 mg daily and ticagrelor 90 mg twice daily      Echocardiogram as above.     For his hyperlipidemia, unable to tolerate statins, Repatha started every 2 weeks- Target LDL less than 70.  Added ezetimibe 10 mg daily to the regimen to further optimize LDL levels.     Continue levothyroxine for hypothyroidism.     Continue diabetic diet, sliding scale insulin, semaglutide, just pharmacotherapy to achieve hemoglobin A1c less than 7.     Strongly urged to quit tobacco use on a permanent basis     When recovered from acute coronary syndrome enroll in cardiac rehab advised continued exercise program weight loss.     Old records reviewed.        Karon Lam, APRN-CNP

## 2024-04-23 NOTE — CARE PLAN
The patient's goals for the shift include      The clinical goals for the shift include patient will not complain of pain    Over the shift, the patient did not make progress toward the following goals. Barriers to progression include none. Recommendations to address these barriers include continue with plan of care.

## 2024-04-23 NOTE — DISCHARGE SUMMARY
Discharge Diagnosis  NSTEMI, presents emergency, type 2 diabetes mellitus, headache, hypothyroidism, obstructive sleep apnea, COPD      Discharge Meds     Your medication list        START taking these medications        Instructions Last Dose Given Next Dose Due   amLODIPine 10 mg tablet  Commonly known as: Norvasc  Start taking on: April 24, 2024      Take 1 tablet (10 mg) by mouth once daily.       ezetimibe 10 mg tablet  Commonly known as: Zetia      Take 1 tablet (10 mg) by mouth once daily at bedtime.       olmesartan 40 mg tablet  Commonly known as: BENIcar      Take 1 tablet (40 mg) by mouth once daily.       ticagrelor 90 mg tablet  Commonly known as: Brilinta      Take 1 tablet (90 mg) by mouth 2 times a day.              CHANGE how you take these medications        Instructions Last Dose Given Next Dose Due   carvedilol 25 mg tablet  Commonly known as: Coreg  What changed:   medication strength  how much to take  how to take this  when to take this      Take 1 tablet (25 mg) by mouth 2 times a day with meals.              CONTINUE taking these medications        Instructions Last Dose Given Next Dose Due   amoxicillin 500 mg tablet  Commonly known as: Amoxil      Take 1 tablet (500 mg) by mouth one hour prior to surgical appt and then one tablet every 8 hours until gone.       aspirin 81 mg EC tablet           buPROPion  mg 24 hr tablet  Commonly known as: Wellbutrin XL      TAKE 1 TABLET BY MOUTH EVERY MORNING       chlorhexidine 0.12 % solution  Commonly known as: Peridex      Use to rinse mouth (swish for 30 seconds and then expectorate) twice a day, starting the day after surgery.       cholecalciferol 5,000 Units tablet  Commonly known as: Vitamin D-3           levothyroxine 50 mcg tablet  Commonly known as: Synthroid, Levoxyl      Take 1.5 tablets (75mcg) by mouth once daily in the morning. Take before meals.       Ozempic 2 mg/dose (8 mg/3 mL) pen injector  Generic drug: semaglutide       Inject 2 mg under the skin 1 (one) time per week.       Repatha SureClick 140 mg/mL injection  Generic drug: evolocumab      Inject 1 mL (140 mg) under the skin every 14 (fourteen) days.       sildenafil 100 mg tablet  Commonly known as: Viagra      Take 1 tablet (100 mg) by mouth if needed for erectile dysfunction. 1 hour before needed       topiramate 25 mg tablet  Commonly known as: Topamax           Tudorza Pressair 400 mcg/actuation inhaler  Generic drug: aclidinium      Inhale 1 puff  by mouth 2 times a day.       ULTRA B-100 COMPLEX (FOODBASE) ORAL           Ventolin HFA 90 mcg/actuation inhaler  Generic drug: albuterol      INHALE 2 PUFFS EVERY 4 HOURS IF NEEDED FOR WHEEZING. 4-6 HOURS AS NEEDED              STOP taking these medications      losartan 100 mg tablet  Commonly known as: Cozaar        nitroglycerin 0.4 mg SL tablet  Commonly known as: Nitrostat                  Where to Get Your Medications        These medications were sent to [facility]  [address] [place] [id number]      Hours: 9 AM to 5:30 PM Mon-Fri Phone: [phone]   amLODIPine 10 mg tablet  carvedilol 25 mg tablet  ezetimibe 10 mg tablet  olmesartan 40 mg tablet  ticagrelor 90 mg tablet         Test Results Pending At Discharge  Pending Labs       No current pending labs.            Hospital Course   Lyle Oh is a 58 y.o. male with a pertinent medical history of morbid obesity, insulin-dependent diabetes, hypothyroidism, obstructive sleep apnea, depression, hypertension, nicotine use disorder, hyperlipidemia not on statin, CAD with coronary stent placement x 3 with last stent being placed 2019 who presented to [facility] ER overnight with chest pain.  EKG revealed new T wave depressions in leads III and aVF and troponin slowly increased from 24-50 3-1 32.  Patient was placed on a heparin drip during the admission, and underwent a left heart cath, having PCI and D EES to the distal left circumflex.  For  hypertensive emergency Coreg was increased from 12.5 mg twice daily to 25 mg twice daily and Norvasc 10 mg was added.  Cardiology replaced on medication of losartan with olmesartan 40 mg daily.    Pertinent Physical Exam At Time of Discharge  Physical Exam  Gen: lying comfortably in bed, not in acute distress  HEENT: atraumatic, normocephalic  Pulm: normal respiratory effort, clear to auscultation b/l  Cardiac: RRR, no murmurs noted, normal S1/S2  GI: Soft, nontender, BS+  MSK: normal ROM without joint swelling  Extremities: no LE edema, cyanosis  Neuro: AOX3, CN II-XII grossly intact, equal b/l strength, no loss in sensation   Psych: calm and appropriate for situation   Outpatient Follow-Up  Future Appointments   Date Time Provider Department [place]   5/7/2024  8:15 AM Kiet Rios MD [id number]   2/19/2025 12:00 PM PHARMACY [id number]         Onofre Gloria DO

## 2024-04-24 ENCOUNTER — PHARMACY VISIT (OUTPATIENT)
Dept: PHARMACY | Facility: CLINIC | Age: 59
End: 2024-04-24
Payer: COMMERCIAL

## 2024-04-24 ENCOUNTER — PATIENT OUTREACH (OUTPATIENT)
Dept: CARE COORDINATION | Facility: CLINIC | Age: 59
End: 2024-04-24
Payer: COMMERCIAL

## 2024-04-24 LAB
ATRIAL RATE: 81 BPM
ATRIAL RATE: 90 BPM
P AXIS: 32 DEGREES
P AXIS: 46 DEGREES
P OFFSET: 191 MS
P OFFSET: 192 MS
P ONSET: 123 MS
P ONSET: 126 MS
PR INTERVAL: 178 MS
PR INTERVAL: 180 MS
Q ONSET: 213 MS
Q ONSET: 215 MS
QRS COUNT: 13 BEATS
QRS COUNT: 15 BEATS
QRS DURATION: 96 MS
QRS DURATION: 96 MS
QT INTERVAL: 364 MS
QT INTERVAL: 406 MS
QTC CALCULATION(BAZETT): 445 MS
QTC CALCULATION(BAZETT): 471 MS
QTC FREDERICIA: 416 MS
QTC FREDERICIA: 448 MS
R AXIS: 43 DEGREES
R AXIS: 6 DEGREES
T AXIS: -13 DEGREES
T AXIS: 76 DEGREES
T OFFSET: 395 MS
T OFFSET: 418 MS
VENTRICULAR RATE: 81 BPM
VENTRICULAR RATE: 90 BPM

## 2024-04-26 NOTE — SIGNIFICANT EVENT
Follow Up Phone Call    Incoming phone call    Spoke to: Lyle Oh Relationship:self   Phone number: [phone]      Outcome: contacted patient/ family   Chief Complaint   Patient presents with    Chest Pain          Diagnosis:Not applicable    States he is feeling better. Cath site free of redness, pain, swelling or drainage.No further questions or concerns.

## 2024-04-26 NOTE — SIGNIFICANT EVENT
Follow Up Phone Call    Outgoing phone call    Spoke to: Lyle Oh Relationship:self   Phone number: [phone]      Outcome: I left a message on answering machine   Chief Complaint   Patient presents with    Chest Pain          Diagnosis:Not applicable

## 2024-04-29 PROCEDURE — RXMED WILLOW AMBULATORY MEDICATION CHARGE

## 2024-05-01 ENCOUNTER — PHARMACY VISIT (OUTPATIENT)
Dept: PHARMACY | Facility: CLINIC | Age: 59
End: 2024-05-01
Payer: COMMERCIAL

## 2024-05-05 NOTE — ED PROCEDURE NOTE
Procedure  Critical Care    Performed by: Sydni Crowley DO  Authorized by: Sydni Crowley DO    Critical care provider statement:     Critical care time (minutes):  32    Critical care time was exclusive of:  Separately billable procedures and treating other patients    Critical care was necessary to treat or prevent imminent or life-threatening deterioration of the following conditions: NSTEMI.    Critical care was time spent personally by me on the following activities:  Blood draw for specimens, development of treatment plan with patient or surrogate, discussions with consultants, discussions with primary provider, evaluation of patient's response to treatment, ordering and performing treatments and interventions, ordering and review of laboratory studies, ordering and review of radiographic studies and re-evaluation of patient's condition    Care discussed with: admitting provider                 Sydni Crowley DO  05/04/24 2014

## 2024-05-07 ENCOUNTER — OFFICE VISIT (OUTPATIENT)
Dept: PRIMARY CARE | Facility: CLINIC | Age: 59
End: 2024-05-07
Payer: COMMERCIAL

## 2024-05-07 VITALS
TEMPERATURE: 96.9 F | DIASTOLIC BLOOD PRESSURE: 82 MMHG | SYSTOLIC BLOOD PRESSURE: 118 MMHG | OXYGEN SATURATION: 99 % | BODY MASS INDEX: 45.38 KG/M2 | WEIGHT: 315 LBS | HEART RATE: 92 BPM

## 2024-05-07 DIAGNOSIS — I21.4 NON-STEMI (NON-ST ELEVATED MYOCARDIAL INFARCTION) (MULTI): Primary | ICD-10-CM

## 2024-05-07 DIAGNOSIS — I25.10 CORONARY ARTERY DISEASE INVOLVING NATIVE CORONARY ARTERY OF NATIVE HEART, UNSPECIFIED WHETHER ANGINA PRESENT: ICD-10-CM

## 2024-05-07 DIAGNOSIS — E11.9 TYPE 2 DIABETES MELLITUS WITHOUT COMPLICATION, UNSPECIFIED WHETHER LONG TERM INSULIN USE (MULTI): ICD-10-CM

## 2024-05-07 DIAGNOSIS — I10 HYPERTENSION, UNCONTROLLED: ICD-10-CM

## 2024-05-07 DIAGNOSIS — J44.1 CHRONIC OBSTRUCTIVE PULMONARY DISEASE WITH ACUTE EXACERBATION (MULTI): ICD-10-CM

## 2024-05-07 DIAGNOSIS — E78.49 OTHER HYPERLIPIDEMIA: ICD-10-CM

## 2024-05-07 DIAGNOSIS — G47.30 SLEEP APNEA, UNSPECIFIED TYPE: ICD-10-CM

## 2024-05-07 PROCEDURE — 99214 OFFICE O/P EST MOD 30 MIN: CPT | Performed by: INTERNAL MEDICINE

## 2024-05-07 PROCEDURE — 3079F DIAST BP 80-89 MM HG: CPT | Performed by: INTERNAL MEDICINE

## 2024-05-07 PROCEDURE — 3061F NEG MICROALBUMINURIA REV: CPT | Performed by: INTERNAL MEDICINE

## 2024-05-07 PROCEDURE — 3008F BODY MASS INDEX DOCD: CPT | Performed by: INTERNAL MEDICINE

## 2024-05-07 PROCEDURE — 3050F LDL-C >= 130 MG/DL: CPT | Performed by: INTERNAL MEDICINE

## 2024-05-07 PROCEDURE — 1036F TOBACCO NON-USER: CPT | Performed by: INTERNAL MEDICINE

## 2024-05-07 PROCEDURE — 3074F SYST BP LT 130 MM HG: CPT | Performed by: INTERNAL MEDICINE

## 2024-05-07 PROCEDURE — 4010F ACE/ARB THERAPY RXD/TAKEN: CPT | Performed by: INTERNAL MEDICINE

## 2024-05-07 ASSESSMENT — ENCOUNTER SYMPTOMS
SINUS PAIN: 0
WHEEZING: 0
DIZZINESS: 0
FATIGUE: 1
PALPITATIONS: 0
UNEXPECTED WEIGHT CHANGE: 1
DIARRHEA: 0
BRUISES/BLEEDS EASILY: 0
FEVER: 0
HEADACHES: 0
DIFFICULTY URINATING: 0
SORE THROAT: 0
BLOOD IN STOOL: 0
ABDOMINAL PAIN: 0
ARTHRALGIAS: 0
COUGH: 0

## 2024-05-07 NOTE — PROGRESS NOTES
Subjective   Patient ID: Lyle Oh is a 58 y.o. male who presents for Coronary Artery Disease (Hospital follow up, one month follow up, new medication changes make him very tired) and Sleep Apnea (Patient is compliant with his CPAP).    - Status post non-STEMI with 99% restenosis of the distal coronary artery restenting with successful revascularization patient discharged home on Brilinta aspirin  - Uncontrolled hypertension carvedilol increased to 25 mg twice a day  Patient now complaining of shortness of breath fatigue and tiredness blood pressure 110/60  Patient scheduled to see cardiology tomorrow advised to discuss changing carvedilol to Toprol-XL due to underlying COPD follow-up results closely  -Hypercholesterolemia statin intolerance patient on Repatha and Zetia follow-up results closely  -Diabetes improving losing weight continue Ozempic 2 mg daily  -Hypertension continue with amlodipine olmesartan  - Hypothyroid controlled continue levothyroxine  -Sleep apnea continue CPAP compliant with CPAP  - Chronic fatigue syndrome continue with Wellbutrin daily  - Obesity patient started weaning off topiramate given start about weaning off until reevaluation in 3 months  -Nicotine dependencyPatient quit smoking encouraged on continuation we will follow-up with patient closely as needed  -COPD compensated continue with Tudorza  Obesity BMI 45 follow-up progress closely reevaluate patient in 3 months    Coronary Artery Disease  Pertinent negatives include no chest pain, dizziness, leg swelling or palpitations.          Review of Systems   Constitutional:  Positive for fatigue and unexpected weight change. Negative for fever.   HENT:  Negative for congestion, ear discharge, ear pain, mouth sores, sinus pain and sore throat.    Eyes:  Negative for visual disturbance.   Respiratory:  Negative for cough and wheezing.    Cardiovascular:  Negative for chest pain, palpitations and leg swelling.   Gastrointestinal:   Negative for abdominal pain, blood in stool and diarrhea.   Genitourinary:  Negative for difficulty urinating.   Musculoskeletal:  Negative for arthralgias.   Skin:  Negative for rash.   Neurological:  Negative for dizziness and headaches.   Hematological:  Does not bruise/bleed easily.   Psychiatric/Behavioral:  Negative for behavioral problems.    All other systems reviewed and are negative.      Objective   Lab Results   Component Value Date    HGBA1C 7.7 (A) 04/04/2024      /82   Pulse 92   Temp 36.1 °C (96.9 °F)   Wt 148 kg (325 lb 6.4 oz)   SpO2 99%   BMI 45.38 kg/m²   Lab Results   Component Value Date    WBC 9.6 04/23/2024    HGB 13.7 04/23/2024    HCT 41.8 04/23/2024     04/23/2024    CHOL 263 (H) 04/03/2024    TRIG 204 (H) 04/03/2024    HDL 38.8 04/03/2024    ALT 29 04/20/2024    AST 17 04/20/2024     04/23/2024    K 3.9 04/23/2024     04/23/2024    CREATININE 1.25 04/23/2024    BUN 16 04/23/2024    CO2 26 04/23/2024    TSH 1.68 04/03/2024    PSA 0.75 04/03/2024    INR 0.9 04/20/2024    HGBA1C 7.7 (A) 04/04/2024     par   Physical Exam  Vitals and nursing note reviewed.   Constitutional:       Appearance: Normal appearance.   HENT:      Head: Normocephalic.      Nose: Nose normal.   Eyes:      Conjunctiva/sclera: Conjunctivae normal.      Pupils: Pupils are equal, round, and reactive to light.   Cardiovascular:      Rate and Rhythm: Regular rhythm.   Pulmonary:      Effort: Pulmonary effort is normal.      Breath sounds: Normal breath sounds.   Abdominal:      General: Abdomen is flat.      Palpations: Abdomen is soft.   Musculoskeletal:      Cervical back: Neck supple.   Skin:     General: Skin is warm.   Neurological:      General: No focal deficit present.      Mental Status: He is oriented to person, place, and time.   Psychiatric:         Mood and Affect: Mood normal.       Assessment/Plan   Lyle was seen today for coronary artery disease and sleep apnea.  Diagnoses  and all orders for this visit:  Non-STEMI (non-ST elevated myocardial infarction) (Multi) (Primary)  Chronic obstructive pulmonary disease with acute exacerbation (Multi)  Coronary artery disease involving native coronary artery of native heart, unspecified whether angina present  Type 2 diabetes mellitus without complication, unspecified whether long term insulin use (Multi)  Other hyperlipidemia  Sleep apnea, unspecified type  Hypertension, uncontrolled  Other orders  -     Follow Up In Primary Care - Established   - Status post non-STEMI with 99% restenosis of the distal coronary artery restenting with successful revascularization patient discharged home on Brilinta aspirin  - Uncontrolled hypertension carvedilol increased to 25 mg twice a day  Patient now complaining of shortness of breath fatigue and tiredness blood pressure 110/60  Patient scheduled to see cardiology tomorrow advised to discuss changing carvedilol to Toprol-XL due to underlying COPD follow-up results closely  -Hypercholesterolemia statin intolerance patient on Repatha and Zetia follow-up results closely  -Diabetes improving losing weight continue Ozempic 2 mg daily  -Hypertension continue with amlodipine olmesartan  - Hypothyroid controlled continue levothyroxine  -Sleep apnea continue CPAP patient compliant on daily basis doing very well  - Chronic fatigue syndrome continue with Wellbutrin daily  - Obesity patient started weaning off topiramate given start about weaning off until reevaluation in 3 months  -Nicotine dependencyPatient quit smoking encouraged on continuation we will follow-up with patient closely as needed  -COPD compensated continue with Tudorza  Obesity BMI 45 follow-up progress closely reevaluate patient in 3 months

## 2024-05-08 LAB
ATRIAL RATE: 73 BPM
ATRIAL RATE: 81 BPM
P AXIS: 23 DEGREES
P AXIS: 49 DEGREES
P OFFSET: 181 MS
P OFFSET: 190 MS
P ONSET: 126 MS
P ONSET: 127 MS
PR INTERVAL: 174 MS
PR INTERVAL: 176 MS
Q ONSET: 213 MS
Q ONSET: 215 MS
QRS COUNT: 12 BEATS
QRS COUNT: 14 BEATS
QRS DURATION: 110 MS
QRS DURATION: 96 MS
QT INTERVAL: 394 MS
QT INTERVAL: 422 MS
QTC CALCULATION(BAZETT): 457 MS
QTC CALCULATION(BAZETT): 464 MS
QTC FREDERICIA: 435 MS
QTC FREDERICIA: 450 MS
R AXIS: 21 DEGREES
R AXIS: 27 DEGREES
T AXIS: -11 DEGREES
T AXIS: -3 DEGREES
T OFFSET: 412 MS
T OFFSET: 424 MS
VENTRICULAR RATE: 73 BPM
VENTRICULAR RATE: 81 BPM

## 2024-05-15 ENCOUNTER — PHARMACY VISIT (OUTPATIENT)
Dept: PHARMACY | Facility: CLINIC | Age: 59
End: 2024-05-15
Payer: COMMERCIAL

## 2024-05-15 ENCOUNTER — OFFICE VISIT (OUTPATIENT)
Dept: PRIMARY CARE | Facility: CLINIC | Age: 59
End: 2024-05-15
Payer: COMMERCIAL

## 2024-05-15 VITALS
DIASTOLIC BLOOD PRESSURE: 102 MMHG | BODY MASS INDEX: 45.27 KG/M2 | HEART RATE: 86 BPM | TEMPERATURE: 97.1 F | WEIGHT: 315 LBS | OXYGEN SATURATION: 97 % | SYSTOLIC BLOOD PRESSURE: 146 MMHG

## 2024-05-15 DIAGNOSIS — E11.69 TYPE 2 DIABETES MELLITUS WITH OBESITY (MULTI): ICD-10-CM

## 2024-05-15 DIAGNOSIS — E66.9 TYPE 2 DIABETES MELLITUS WITH OBESITY (MULTI): ICD-10-CM

## 2024-05-15 DIAGNOSIS — I25.10 CORONARY ARTERY DISEASE INVOLVING NATIVE CORONARY ARTERY OF NATIVE HEART, UNSPECIFIED WHETHER ANGINA PRESENT: ICD-10-CM

## 2024-05-15 DIAGNOSIS — K04.7 TOOTH ABSCESS: Primary | ICD-10-CM

## 2024-05-15 DIAGNOSIS — I21.4 NON-STEMI (NON-ST ELEVATED MYOCARDIAL INFARCTION) (MULTI): ICD-10-CM

## 2024-05-15 DIAGNOSIS — E66.01 MORBID OBESITY WITH BMI OF 45.0-49.9, ADULT (MULTI): ICD-10-CM

## 2024-05-15 PROCEDURE — 3080F DIAST BP >= 90 MM HG: CPT | Performed by: INTERNAL MEDICINE

## 2024-05-15 PROCEDURE — 3050F LDL-C >= 130 MG/DL: CPT | Performed by: INTERNAL MEDICINE

## 2024-05-15 PROCEDURE — 1036F TOBACCO NON-USER: CPT | Performed by: INTERNAL MEDICINE

## 2024-05-15 PROCEDURE — 3077F SYST BP >= 140 MM HG: CPT | Performed by: INTERNAL MEDICINE

## 2024-05-15 PROCEDURE — 4010F ACE/ARB THERAPY RXD/TAKEN: CPT | Performed by: INTERNAL MEDICINE

## 2024-05-15 PROCEDURE — RXMED WILLOW AMBULATORY MEDICATION CHARGE

## 2024-05-15 PROCEDURE — 3008F BODY MASS INDEX DOCD: CPT | Performed by: INTERNAL MEDICINE

## 2024-05-15 PROCEDURE — 3061F NEG MICROALBUMINURIA REV: CPT | Performed by: INTERNAL MEDICINE

## 2024-05-15 PROCEDURE — 99214 OFFICE O/P EST MOD 30 MIN: CPT | Performed by: INTERNAL MEDICINE

## 2024-05-15 RX ORDER — HYDROCODONE BITARTRATE AND ACETAMINOPHEN 5; 325 MG/1; MG/1
1 TABLET ORAL EVERY 6 HOURS PRN
Qty: 30 TABLET | Refills: 0 | Status: SHIPPED | OUTPATIENT
Start: 2024-05-15 | End: 2024-05-25

## 2024-05-15 ASSESSMENT — ENCOUNTER SYMPTOMS
HEADACHES: 0
BRUISES/BLEEDS EASILY: 0
SINUS PAIN: 0
DIFFICULTY URINATING: 0
FEVER: 0
FATIGUE: 0
COUGH: 0
ARTHRALGIAS: 0
DIZZINESS: 0
BLOOD IN STOOL: 0
UNEXPECTED WEIGHT CHANGE: 0
SORE THROAT: 0
ABDOMINAL PAIN: 0
DIARRHEA: 0
PALPITATIONS: 0
WHEEZING: 0

## 2024-05-15 NOTE — PROGRESS NOTES
Subjective   Patient ID: Lyle Oh is a 58 y.o. male who presents for Dental Pain (Having root canal done in [place] next month, not sure what he can take with recent Non-STEMI).    - Patient with recent non-STEMI developed right tooth abscess seen by dentist given Advil for pain comes today for evaluation awaiting 4 weeks for endodontist to see  -Patient instructed to avoid any NSAIDs due to current use of Brilinta and aspirin  - High risk for bleeding  -[place] OAS reviewed will give patient prescription for Vicodin 30 tablets without refill for pain control until evaluation by endodontist may use Tylenol as needed for pain  Coronary artery disease stable no chest pain need to discuss with cardiology further if any need to interrupt Brilinta which is not advisable at this time  -Diabetes improving continue with current medication  - COPD compensated continue with current plan of treatment  - Obesity slowly improving continue with current plan of treatment  Follow-up as needed    Dental Pain   Pertinent negatives include no fever.          Review of Systems   Constitutional:  Negative for fatigue, fever and unexpected weight change.   HENT:  Positive for dental problem. Negative for congestion, ear discharge, ear pain, mouth sores, sinus pain and sore throat.    Eyes:  Negative for visual disturbance.   Respiratory:  Negative for cough and wheezing.    Cardiovascular:  Negative for chest pain, palpitations and leg swelling.   Gastrointestinal:  Negative for abdominal pain, blood in stool and diarrhea.   Genitourinary:  Negative for difficulty urinating.   Musculoskeletal:  Negative for arthralgias.   Skin:  Negative for rash.   Neurological:  Negative for dizziness and headaches.   Hematological:  Does not bruise/bleed easily.   Psychiatric/Behavioral:  Negative for behavioral problems.    All other systems reviewed and are negative.      Objective   Lab Results   Component Value Date    HGBA1C 7.7 (A)  04/04/2024      BP (!) 146/102   Pulse 86   Temp 36.2 °C (97.1 °F)   Wt 147 kg (324 lb 9.6 oz)   SpO2 97%   BMI 45.27 kg/m²     Physical Exam  Vitals and nursing note reviewed.   Constitutional:       Appearance: Normal appearance.   HENT:      Head: Normocephalic.      Nose: Nose normal.   Eyes:      Conjunctiva/sclera: Conjunctivae normal.      Pupils: Pupils are equal, round, and reactive to light.   Cardiovascular:      Rate and Rhythm: Regular rhythm.   Pulmonary:      Effort: Pulmonary effort is normal.      Breath sounds: Normal breath sounds.   Abdominal:      General: Abdomen is flat.      Palpations: Abdomen is soft.   Musculoskeletal:      Cervical back: Neck supple.   Skin:     General: Skin is warm.   Neurological:      General: No focal deficit present.      Mental Status: He is oriented to person, place, and time.   Psychiatric:         Mood and Affect: Mood normal.         Assessment/Plan   Lyle was seen today for dental pain.  Diagnoses and all orders for this visit:  Tooth abscess (Primary)  -     HYDROcodone-acetaminophen (Norco) 5-325 mg tablet; Take 1 tablet by mouth every 6 hours if needed for severe pain (7 - 10) for up to 10 days.  Type 2 diabetes mellitus with obesity (Multi)  Coronary artery disease involving native coronary artery of native heart, unspecified whether angina present  Non-STEMI (non-ST elevated myocardial infarction) (Multi)  Morbid obesity with BMI of 45.0-49.9, adult (Multi)    Patient with recent non-STEMI developed right tooth abscess seen by dentist given Advil for pain comes today for evaluation awaiting 4 weeks for endodontist to see  -Patient instructed to avoid any NSAIDs due to current use of Brilinta and aspirin  - High risk for bleeding  -[place] OARRS reviewed will give patient prescription for Vicodin 30 tablets without refill for pain control until evaluation by endodontist may use Tylenol as needed for pain  Coronary artery disease stable no chest pain  need to discuss with cardiology further if any need to interrupt Brilinta which is not advisable at this time  -Diabetes improving continue with current medication  - COPD compensated continue with current plan of treatment  - Obesity slowly improving continue with current plan of treatment  Follow-up as neededOARRS:  No data recorded  I have personally reviewed the OARRS report for Lyle Oh. I have considered the risks of abuse, dependence, addiction and diversion    Is the patient prescribed a combination of a benzodiazepine and opioid?  No    Last Urine Drug Screen / ordered today: No  No results found for this or any previous visit (from the past 8760 hour(s)).  N/A    Clinical rationale for not completing a Urine Drug Screen: Acute pain      Controlled Substance Agreement:  Date of the Last Agreement: Acute pain not needed      Opioids:  What is the patient's goal of therapy?  Pain control Emtansin endodontist  Is this being achieved with current treatment?  Acute tooth pain    I have calculated the patient's Morphine Dose Equivalent (MED):   I have considered referral to Pain Management and/or a specialist, and do not feel it is necessary at this time.    I feel that it is clinically indicated to continue this current medication regimen after consideration of alternative therapies, and other non-opioid treatment.    Opioid Risk Screening:  No data recorded    Pain Assessment:  No data recorded

## 2024-05-24 ENCOUNTER — PATIENT OUTREACH (OUTPATIENT)
Dept: CARE COORDINATION | Facility: CLINIC | Age: 59
End: 2024-05-24
Payer: COMMERCIAL

## 2024-05-24 NOTE — PROGRESS NOTES
[facility] [place]:  Call placed and VMM left.  Lyle is enrolled in the VBID program.  Will close the [facility]ARDO program

## 2024-05-31 DIAGNOSIS — J44.1 CHRONIC OBSTRUCTIVE PULMONARY DISEASE WITH ACUTE EXACERBATION (MULTI): Chronic | ICD-10-CM

## 2024-05-31 PROCEDURE — RXMED WILLOW AMBULATORY MEDICATION CHARGE

## 2024-05-31 RX ORDER — ACLIDINIUM BROMIDE 400 UG/1
1 POWDER, METERED RESPIRATORY (INHALATION)
Qty: 1 EACH | Refills: 2 | Status: CANCELLED | OUTPATIENT
Start: 2024-05-31

## 2024-05-31 RX ORDER — ACLIDINIUM BROMIDE 400 UG/1
1 POWDER, METERED RESPIRATORY (INHALATION)
Qty: 1 EACH | Refills: 2 | Status: SHIPPED | OUTPATIENT
Start: 2024-05-31

## 2024-06-03 ENCOUNTER — PHARMACY VISIT (OUTPATIENT)
Dept: PHARMACY | Facility: CLINIC | Age: 59
End: 2024-06-03
Payer: COMMERCIAL

## 2024-06-03 PROCEDURE — RXMED WILLOW AMBULATORY MEDICATION CHARGE

## 2024-06-03 RX ORDER — AMOXICILLIN 500 MG/1
TABLET, FILM COATED ORAL
Qty: 21 TABLET | Refills: 0 | OUTPATIENT
Start: 2024-06-03

## 2024-06-20 ENCOUNTER — PHARMACY VISIT (OUTPATIENT)
Dept: PHARMACY | Facility: CLINIC | Age: 59
End: 2024-06-20
Payer: COMMERCIAL

## 2024-06-20 PROCEDURE — RXMED WILLOW AMBULATORY MEDICATION CHARGE

## 2024-06-20 RX ORDER — AMOXICILLIN 500 MG/1
500 TABLET, FILM COATED ORAL EVERY 8 HOURS
Qty: 21 TABLET | Refills: 0 | OUTPATIENT
Start: 2024-06-20

## 2024-06-26 PROCEDURE — RXMED WILLOW AMBULATORY MEDICATION CHARGE

## 2024-06-28 ENCOUNTER — PHARMACY VISIT (OUTPATIENT)
Dept: PHARMACY | Facility: CLINIC | Age: 59
End: 2024-06-28
Payer: COMMERCIAL

## 2024-07-17 DIAGNOSIS — N52.9 ERECTILE DYSFUNCTION, UNSPECIFIED ERECTILE DYSFUNCTION TYPE: Chronic | ICD-10-CM

## 2024-07-17 DIAGNOSIS — J44.9 CHRONIC OBSTRUCTIVE PULMONARY DISEASE, UNSPECIFIED COPD TYPE (MULTI): Chronic | ICD-10-CM

## 2024-07-18 DIAGNOSIS — J44.1 CHRONIC OBSTRUCTIVE PULMONARY DISEASE WITH ACUTE EXACERBATION (MULTI): Chronic | ICD-10-CM

## 2024-07-18 DIAGNOSIS — E78.49 OTHER HYPERLIPIDEMIA: ICD-10-CM

## 2024-07-18 DIAGNOSIS — E66.9 TYPE 2 DIABETES MELLITUS WITH OBESITY (MULTI): ICD-10-CM

## 2024-07-18 DIAGNOSIS — I25.10 CORONARY ARTERY DISEASE INVOLVING NATIVE CORONARY ARTERY OF NATIVE HEART, UNSPECIFIED WHETHER ANGINA PRESENT: ICD-10-CM

## 2024-07-18 DIAGNOSIS — E11.69 TYPE 2 DIABETES MELLITUS WITH OBESITY (MULTI): ICD-10-CM

## 2024-07-18 PROCEDURE — RXMED WILLOW AMBULATORY MEDICATION CHARGE

## 2024-07-18 RX ORDER — SEMAGLUTIDE 2.68 MG/ML
2 INJECTION, SOLUTION SUBCUTANEOUS
Qty: 9 ML | Refills: 1 | Status: SHIPPED | OUTPATIENT
Start: 2024-07-21

## 2024-07-18 RX ORDER — ACLIDINIUM BROMIDE 400 UG/1
1 POWDER, METERED RESPIRATORY (INHALATION)
Qty: 3 EACH | Refills: 1 | Status: SHIPPED | OUTPATIENT
Start: 2024-07-18

## 2024-07-18 RX ORDER — SILDENAFIL 100 MG/1
100 TABLET, FILM COATED ORAL AS NEEDED
Qty: 10 TABLET | Refills: 2 | Status: SHIPPED | OUTPATIENT
Start: 2024-07-18

## 2024-07-18 RX ORDER — ALBUTEROL SULFATE 90 UG/1
AEROSOL, METERED RESPIRATORY (INHALATION)
Qty: 54 G | Refills: 1 | Status: SHIPPED | OUTPATIENT
Start: 2024-07-18 | End: 2025-07-18

## 2024-07-19 ENCOUNTER — PHARMACY VISIT (OUTPATIENT)
Dept: PHARMACY | Facility: CLINIC | Age: 59
End: 2024-07-19
Payer: COMMERCIAL

## 2024-07-19 PROCEDURE — RXMED WILLOW AMBULATORY MEDICATION CHARGE

## 2024-08-12 ENCOUNTER — APPOINTMENT (OUTPATIENT)
Dept: PRIMARY CARE | Facility: CLINIC | Age: 59
End: 2024-08-12
Payer: COMMERCIAL

## 2024-08-12 VITALS
DIASTOLIC BLOOD PRESSURE: 98 MMHG | HEART RATE: 88 BPM | OXYGEN SATURATION: 98 % | SYSTOLIC BLOOD PRESSURE: 136 MMHG | BODY MASS INDEX: 46.28 KG/M2 | WEIGHT: 315 LBS | TEMPERATURE: 97.1 F

## 2024-08-12 DIAGNOSIS — J44.1 CHRONIC OBSTRUCTIVE PULMONARY DISEASE WITH ACUTE EXACERBATION (MULTI): ICD-10-CM

## 2024-08-12 DIAGNOSIS — I21.4 NON-STEMI (NON-ST ELEVATED MYOCARDIAL INFARCTION) (MULTI): ICD-10-CM

## 2024-08-12 DIAGNOSIS — E66.01 MORBID OBESITY WITH BMI OF 45.0-49.9, ADULT (MULTI): ICD-10-CM

## 2024-08-12 DIAGNOSIS — I10 HYPERTENSION, UNCONTROLLED: ICD-10-CM

## 2024-08-12 DIAGNOSIS — E11.69 TYPE 2 DIABETES MELLITUS WITH OBESITY (MULTI): Primary | ICD-10-CM

## 2024-08-12 DIAGNOSIS — E11.9 TYPE 2 DIABETES MELLITUS WITHOUT COMPLICATION, UNSPECIFIED WHETHER LONG TERM INSULIN USE (MULTI): ICD-10-CM

## 2024-08-12 DIAGNOSIS — Z00.00 ANNUAL PHYSICAL EXAM: ICD-10-CM

## 2024-08-12 DIAGNOSIS — E66.9 TYPE 2 DIABETES MELLITUS WITH OBESITY (MULTI): Primary | ICD-10-CM

## 2024-08-12 LAB
POC FINGERSTICK BLOOD GLUCOSE: 154 MG/DL (ref 70–100)
POC HEMOGLOBIN A1C: 6.9 % (ref 4.2–6.5)

## 2024-08-12 PROCEDURE — 1036F TOBACCO NON-USER: CPT | Performed by: INTERNAL MEDICINE

## 2024-08-12 PROCEDURE — 3050F LDL-C >= 130 MG/DL: CPT | Performed by: INTERNAL MEDICINE

## 2024-08-12 PROCEDURE — 3080F DIAST BP >= 90 MM HG: CPT | Performed by: INTERNAL MEDICINE

## 2024-08-12 PROCEDURE — 83036 HEMOGLOBIN GLYCOSYLATED A1C: CPT | Performed by: INTERNAL MEDICINE

## 2024-08-12 PROCEDURE — 3061F NEG MICROALBUMINURIA REV: CPT | Performed by: INTERNAL MEDICINE

## 2024-08-12 PROCEDURE — 99214 OFFICE O/P EST MOD 30 MIN: CPT | Performed by: INTERNAL MEDICINE

## 2024-08-12 PROCEDURE — 4010F ACE/ARB THERAPY RXD/TAKEN: CPT | Performed by: INTERNAL MEDICINE

## 2024-08-12 PROCEDURE — 3075F SYST BP GE 130 - 139MM HG: CPT | Performed by: INTERNAL MEDICINE

## 2024-08-12 PROCEDURE — 82962 GLUCOSE BLOOD TEST: CPT | Performed by: INTERNAL MEDICINE

## 2024-08-12 ASSESSMENT — ENCOUNTER SYMPTOMS
FEVER: 0
PALPITATIONS: 0
SORE THROAT: 0
COUGH: 0
DIFFICULTY URINATING: 0
HEADACHES: 0
UNEXPECTED WEIGHT CHANGE: 0
FATIGUE: 0
ABDOMINAL PAIN: 0
DIZZINESS: 0
SINUS PAIN: 0
BLOOD IN STOOL: 0
BRUISES/BLEEDS EASILY: 0
DIARRHEA: 0
WHEEZING: 0
ARTHRALGIAS: 0

## 2024-08-12 NOTE — PROGRESS NOTES
Subjective   Patient ID: Lyle Oh is a 58 y.o. male who presents for Follow-up (3 mth ).    - Diabetes improving continue with weight loss low-carb diet continue on Ozempic  -Coronary artery disease/non-STEMI developed right tooth continues purulent continue with current medication follow-up cardiology  No shortness of breath maximize medical management  - COPD compensated continue with current plan of treatment continue Trelegy  Counseled about nicotine cessation  Patient using 1 pack every 2 days counseled about risk of benefit  - Hypercholesteremia controlled  - Obstructive sleep apnea compensated  Follow-up as needed need to complete blood work albumin urine spot in 6 months           Review of Systems   Constitutional:  Negative for fatigue, fever and unexpected weight change.   HENT:  Negative for congestion, ear discharge, ear pain, mouth sores, sinus pain and sore throat.    Eyes:  Negative for visual disturbance.   Respiratory:  Negative for cough and wheezing.    Cardiovascular:  Negative for chest pain, palpitations and leg swelling.   Gastrointestinal:  Negative for abdominal pain, blood in stool and diarrhea.   Genitourinary:  Negative for difficulty urinating.   Musculoskeletal:  Negative for arthralgias.   Skin:  Negative for rash.   Neurological:  Negative for dizziness and headaches.   Hematological:  Does not bruise/bleed easily.   Psychiatric/Behavioral:  Negative for behavioral problems.    All other systems reviewed and are negative.      Objective   Lab Results   Component Value Date    HGBA1C 6.9 (A) 08/12/2024      BP (!) 136/98   Pulse 88   Temp 36.2 °C (97.1 °F)   Wt (!) 151 kg (331 lb 12.8 oz)   SpO2 98%   BMI 46.28 kg/m²   Lab Results   Component Value Date    WBC 9.6 04/23/2024    HGB 13.7 04/23/2024    HCT 41.8 04/23/2024     04/23/2024    CHOL 263 (H) 04/03/2024    TRIG 204 (H) 04/03/2024    HDL 38.8 04/03/2024    ALT 29 04/20/2024    AST 17 04/20/2024      04/23/2024    K 3.9 04/23/2024     04/23/2024    CREATININE 1.25 04/23/2024    BUN 16 04/23/2024    CO2 26 04/23/2024    TSH 1.68 04/03/2024    PSA 0.75 04/03/2024    INR 0.9 04/20/2024    HGBA1C 6.9 (A) 08/12/2024     par   Physical Exam  Vitals and nursing note reviewed.   Constitutional:       Appearance: Normal appearance.   HENT:      Head: Normocephalic.      Nose: Nose normal.   Eyes:      Conjunctiva/sclera: Conjunctivae normal.      Pupils: Pupils are equal, round, and reactive to light.   Cardiovascular:      Rate and Rhythm: Regular rhythm.   Pulmonary:      Effort: Pulmonary effort is normal.      Breath sounds: Normal breath sounds.   Abdominal:      General: Abdomen is flat.      Palpations: Abdomen is soft.   Musculoskeletal:      Cervical back: Neck supple.   Skin:     General: Skin is warm.   Neurological:      General: No focal deficit present.      Mental Status: He is oriented to person, place, and time.   Psychiatric:         Mood and Affect: Mood normal.         Assessment/Plan   Lyle was seen today for follow-up.  Diagnoses and all orders for this visit:  Type 2 diabetes mellitus with obesity (Multi) (Primary)  -     POCT glycosylated hemoglobin (Hb A1C) manually resulted  -     POCT fingerstick glucose manually resulted  -     Albumin-Creatinine Ratio, Urine Random; Future  Type 2 diabetes mellitus without complication, unspecified whether long term insulin use (Multi)  Non-STEMI (non-ST elevated myocardial infarction) (Multi)  Chronic obstructive pulmonary disease with acute exacerbation (Multi)  Hypertension, uncontrolled  Morbid obesity with BMI of 45.0-49.9, adult (Multi)  Annual physical exam  -     CBC and Auto Differential; Future  -     Comprehensive Metabolic Panel; Future  -     Lipid Panel; Future  -     Magnesium; Future  -     Prostate Specific Antigen; Future  -     TSH with reflex to Free T4 if abnormal; Future  -     Vitamin D 25-Hydroxy,Total (for eval of Vitamin D  levels); Future  Other orders  -     Follow Up In Primary Care - Health Maintenance; Future    Diabetes improving continue with weight loss low-carb diet continue on Ozempic  -Coronary artery disease/non-STEMI developed right tooth continues purulent continue with current medication follow-up cardiology  No shortness of breath maximize medical management  - COPD compensated continue with current plan of treatment continue Trelegy  Counseled about nicotine cessation  Patient using 1 pack every 2 days counseled about risk of benefit  - Hypercholesteremia controlled  - Obstructive sleep apnea compensated  Follow-up as needed need to complete blood work albumin urine spot in 6 months

## 2024-08-16 PROCEDURE — RXMED WILLOW AMBULATORY MEDICATION CHARGE

## 2024-08-20 ENCOUNTER — PHARMACY VISIT (OUTPATIENT)
Dept: PHARMACY | Facility: CLINIC | Age: 59
End: 2024-08-20
Payer: COMMERCIAL

## 2024-09-19 PROCEDURE — RXMED WILLOW AMBULATORY MEDICATION CHARGE

## 2024-09-23 ENCOUNTER — PHARMACY VISIT (OUTPATIENT)
Dept: PHARMACY | Facility: CLINIC | Age: 59
End: 2024-09-23
Payer: COMMERCIAL

## 2024-10-14 DIAGNOSIS — E03.9 ACQUIRED HYPOTHYROIDISM: Chronic | ICD-10-CM

## 2024-10-14 DIAGNOSIS — F41.8 DEPRESSION WITH ANXIETY: Chronic | ICD-10-CM

## 2024-10-14 PROCEDURE — RXMED WILLOW AMBULATORY MEDICATION CHARGE

## 2024-10-14 RX ORDER — BUPROPION HYDROCHLORIDE 150 MG/1
150 TABLET ORAL
Qty: 90 TABLET | Refills: 1 | Status: SHIPPED | OUTPATIENT
Start: 2024-10-14 | End: 2025-10-14

## 2024-10-14 RX ORDER — LEVOTHYROXINE SODIUM 50 UG/1
75 TABLET ORAL
Qty: 135 TABLET | Refills: 1 | Status: SHIPPED | OUTPATIENT
Start: 2024-10-14

## 2024-10-17 ENCOUNTER — PHARMACY VISIT (OUTPATIENT)
Dept: PHARMACY | Facility: CLINIC | Age: 59
End: 2024-10-17
Payer: COMMERCIAL

## 2024-10-17 PROCEDURE — RXMED WILLOW AMBULATORY MEDICATION CHARGE

## 2024-11-18 PROCEDURE — RXMED WILLOW AMBULATORY MEDICATION CHARGE

## 2024-11-21 ENCOUNTER — PHARMACY VISIT (OUTPATIENT)
Dept: PHARMACY | Facility: CLINIC | Age: 59
End: 2024-11-21
Payer: COMMERCIAL

## 2024-12-16 PROCEDURE — RXMED WILLOW AMBULATORY MEDICATION CHARGE

## 2024-12-19 ENCOUNTER — PHARMACY VISIT (OUTPATIENT)
Dept: PHARMACY | Facility: CLINIC | Age: 59
End: 2024-12-19
Payer: COMMERCIAL

## 2024-12-23 DIAGNOSIS — E78.49 OTHER HYPERLIPIDEMIA: ICD-10-CM

## 2024-12-23 DIAGNOSIS — E11.69 TYPE 2 DIABETES MELLITUS WITH OBESITY (MULTI): ICD-10-CM

## 2024-12-23 DIAGNOSIS — E66.9 TYPE 2 DIABETES MELLITUS WITH OBESITY (MULTI): ICD-10-CM

## 2024-12-23 DIAGNOSIS — Z78.9 STATIN INTOLERANCE: ICD-10-CM

## 2024-12-23 DIAGNOSIS — E11.9 TYPE 2 DIABETES MELLITUS WITHOUT COMPLICATION, UNSPECIFIED WHETHER LONG TERM INSULIN USE (MULTI): ICD-10-CM

## 2024-12-23 DIAGNOSIS — I25.10 CORONARY ARTERY DISEASE INVOLVING NATIVE CORONARY ARTERY OF NATIVE HEART, UNSPECIFIED WHETHER ANGINA PRESENT: ICD-10-CM

## 2024-12-23 DIAGNOSIS — J44.9 CHRONIC OBSTRUCTIVE PULMONARY DISEASE, UNSPECIFIED COPD TYPE (MULTI): Chronic | ICD-10-CM

## 2024-12-23 PROCEDURE — RXMED WILLOW AMBULATORY MEDICATION CHARGE

## 2024-12-24 ENCOUNTER — PHARMACY VISIT (OUTPATIENT)
Dept: PHARMACY | Facility: CLINIC | Age: 59
End: 2024-12-24
Payer: COMMERCIAL

## 2024-12-24 PROCEDURE — RXMED WILLOW AMBULATORY MEDICATION CHARGE

## 2024-12-24 RX ORDER — SEMAGLUTIDE 2.68 MG/ML
2 INJECTION, SOLUTION SUBCUTANEOUS
Qty: 9 ML | Refills: 1 | Status: SHIPPED | OUTPATIENT
Start: 2024-12-24

## 2024-12-24 RX ORDER — ALBUTEROL SULFATE 90 UG/1
AEROSOL, METERED RESPIRATORY (INHALATION)
Qty: 54 G | Refills: 1 | Status: SHIPPED | OUTPATIENT
Start: 2024-12-24 | End: 2025-12-24

## 2024-12-24 RX ORDER — EVOLOCUMAB 140 MG/ML
140 INJECTION, SOLUTION SUBCUTANEOUS
Qty: 2 ML | Refills: 11 | Status: SHIPPED | OUTPATIENT
Start: 2024-12-24 | End: 2025-12-24

## 2025-01-13 ENCOUNTER — PHARMACY VISIT (OUTPATIENT)
Dept: PHARMACY | Facility: CLINIC | Age: 60
End: 2025-01-13
Payer: COMMERCIAL

## 2025-01-13 DIAGNOSIS — J44.1 CHRONIC OBSTRUCTIVE PULMONARY DISEASE WITH ACUTE EXACERBATION (MULTI): Chronic | ICD-10-CM

## 2025-01-13 PROCEDURE — RXMED WILLOW AMBULATORY MEDICATION CHARGE

## 2025-01-13 RX ORDER — ACLIDINIUM BROMIDE 400 UG/1
1 POWDER, METERED RESPIRATORY (INHALATION)
Qty: 3 EACH | Refills: 1 | Status: CANCELLED | OUTPATIENT
Start: 2025-01-13

## 2025-01-13 RX ORDER — ACLIDINIUM BROMIDE 400 UG/1
1 POWDER, METERED RESPIRATORY (INHALATION)
Qty: 3 EACH | Refills: 1 | Status: SHIPPED | OUTPATIENT
Start: 2025-01-13

## 2025-02-03 DIAGNOSIS — E11.69 TYPE 2 DIABETES MELLITUS WITH OBESITY (MULTI): ICD-10-CM

## 2025-02-03 DIAGNOSIS — E66.9 TYPE 2 DIABETES MELLITUS WITH OBESITY (MULTI): ICD-10-CM

## 2025-02-04 ENCOUNTER — PHARMACY VISIT (OUTPATIENT)
Dept: PHARMACY | Facility: CLINIC | Age: 60
End: 2025-02-04
Payer: COMMERCIAL

## 2025-02-04 LAB
ALBUMIN SERPL-MCNC: 4.6 G/DL (ref 3.6–5.1)
ALP SERPL-CCNC: 48 U/L (ref 35–144)
ALT SERPL-CCNC: 25 U/L (ref 9–46)
ANION GAP SERPL CALCULATED.4IONS-SCNC: 12 MMOL/L (CALC) (ref 7–17)
AST SERPL-CCNC: 21 U/L (ref 10–35)
BASOPHILS # BLD AUTO: 39 CELLS/UL (ref 0–200)
BASOPHILS NFR BLD AUTO: 0.5 %
BILIRUB SERPL-MCNC: 0.6 MG/DL (ref 0.2–1.2)
BUN SERPL-MCNC: 14 MG/DL (ref 7–25)
CALCIUM SERPL-MCNC: 9.8 MG/DL (ref 8.6–10.3)
CHLORIDE SERPL-SCNC: 104 MMOL/L (ref 98–110)
CHOLEST SERPL-MCNC: 97 MG/DL
CHOLEST/HDLC SERPL: 2.1 (CALC)
CO2 SERPL-SCNC: 24 MMOL/L (ref 20–32)
CREAT SERPL-MCNC: 1.26 MG/DL (ref 0.7–1.3)
EGFRCR SERPLBLD CKD-EPI 2021: 67 ML/MIN/1.73M2
EOSINOPHIL # BLD AUTO: 78 CELLS/UL (ref 15–500)
EOSINOPHIL NFR BLD AUTO: 1 %
ERYTHROCYTE [DISTWIDTH] IN BLOOD BY AUTOMATED COUNT: 13.8 % (ref 11–15)
EST. AVERAGE GLUCOSE BLD GHB EST-MCNC: 137 MG/DL
EST. AVERAGE GLUCOSE BLD GHB EST-SCNC: 7.6 MMOL/L
GLUCOSE SERPL-MCNC: 116 MG/DL (ref 65–99)
HBA1C MFR BLD: 6.4 % OF TOTAL HGB
HCT VFR BLD AUTO: 44.4 % (ref 38.5–50)
HDLC SERPL-MCNC: 47 MG/DL
HGB BLD-MCNC: 14.6 G/DL (ref 13.2–17.1)
LDLC SERPL CALC-MCNC: 35 MG/DL (CALC)
LYMPHOCYTES # BLD AUTO: 1654 CELLS/UL (ref 850–3900)
LYMPHOCYTES NFR BLD AUTO: 21.2 %
MAGNESIUM SERPL-MCNC: 2.3 MG/DL (ref 1.5–2.5)
MCH RBC QN AUTO: 29.3 PG (ref 27–33)
MCHC RBC AUTO-ENTMCNC: 32.9 G/DL (ref 32–36)
MCV RBC AUTO: 89.2 FL (ref 80–100)
MONOCYTES # BLD AUTO: 421 CELLS/UL (ref 200–950)
MONOCYTES NFR BLD AUTO: 5.4 %
NEUTROPHILS # BLD AUTO: 5608 CELLS/UL (ref 1500–7800)
NEUTROPHILS NFR BLD AUTO: 71.9 %
NONHDLC SERPL-MCNC: 50 MG/DL (CALC)
PLATELET # BLD AUTO: 305 THOUSAND/UL (ref 140–400)
PMV BLD REES-ECKER: 10.3 FL (ref 7.5–12.5)
POTASSIUM SERPL-SCNC: 4.3 MMOL/L (ref 3.5–5.3)
PROT SERPL-MCNC: 7.4 G/DL (ref 6.1–8.1)
PSA SERPL-MCNC: 0.99 NG/ML
RBC # BLD AUTO: 4.98 MILLION/UL (ref 4.2–5.8)
SODIUM SERPL-SCNC: 140 MMOL/L (ref 135–146)
TRIGL SERPL-MCNC: 72 MG/DL
TSH SERPL-ACNC: 3.6 MIU/L (ref 0.4–4.5)
WBC # BLD AUTO: 7.8 THOUSAND/UL (ref 3.8–10.8)

## 2025-02-04 PROCEDURE — RXMED WILLOW AMBULATORY MEDICATION CHARGE

## 2025-02-06 ENCOUNTER — APPOINTMENT (OUTPATIENT)
Dept: PRIMARY CARE | Facility: CLINIC | Age: 60
End: 2025-02-06
Payer: COMMERCIAL

## 2025-02-17 ENCOUNTER — APPOINTMENT (OUTPATIENT)
Dept: PRIMARY CARE | Facility: CLINIC | Age: 60
End: 2025-02-17
Payer: COMMERCIAL

## 2025-02-17 VITALS
WEIGHT: 299.8 LBS | OXYGEN SATURATION: 99 % | HEIGHT: 70 IN | SYSTOLIC BLOOD PRESSURE: 140 MMHG | BODY MASS INDEX: 42.92 KG/M2 | DIASTOLIC BLOOD PRESSURE: 110 MMHG | HEART RATE: 89 BPM | TEMPERATURE: 97.8 F

## 2025-02-17 DIAGNOSIS — J44.1 CHRONIC OBSTRUCTIVE PULMONARY DISEASE WITH ACUTE EXACERBATION (MULTI): ICD-10-CM

## 2025-02-17 DIAGNOSIS — I25.10 CORONARY ARTERY DISEASE INVOLVING NATIVE CORONARY ARTERY OF NATIVE HEART, UNSPECIFIED WHETHER ANGINA PRESENT: ICD-10-CM

## 2025-02-17 DIAGNOSIS — G47.30 SLEEP APNEA, UNSPECIFIED TYPE: ICD-10-CM

## 2025-02-17 DIAGNOSIS — E78.49 OTHER HYPERLIPIDEMIA: ICD-10-CM

## 2025-02-17 DIAGNOSIS — E11.69 TYPE 2 DIABETES MELLITUS WITH OBESITY (MULTI): ICD-10-CM

## 2025-02-17 DIAGNOSIS — E66.9 TYPE 2 DIABETES MELLITUS WITH OBESITY (MULTI): ICD-10-CM

## 2025-02-17 DIAGNOSIS — E11.42 DIABETIC POLYNEUROPATHY ASSOCIATED WITH TYPE 2 DIABETES MELLITUS (MULTI): ICD-10-CM

## 2025-02-17 DIAGNOSIS — Z87.891 HISTORY OF SMOKING: Primary | ICD-10-CM

## 2025-02-17 DIAGNOSIS — Z00.00 ANNUAL PHYSICAL EXAM: ICD-10-CM

## 2025-02-17 DIAGNOSIS — I21.4 NON-STEMI (NON-ST ELEVATED MYOCARDIAL INFARCTION) (MULTI): ICD-10-CM

## 2025-02-17 PROCEDURE — 3080F DIAST BP >= 90 MM HG: CPT | Performed by: INTERNAL MEDICINE

## 2025-02-17 PROCEDURE — 4010F ACE/ARB THERAPY RXD/TAKEN: CPT | Performed by: INTERNAL MEDICINE

## 2025-02-17 PROCEDURE — 3077F SYST BP >= 140 MM HG: CPT | Performed by: INTERNAL MEDICINE

## 2025-02-17 PROCEDURE — 1036F TOBACCO NON-USER: CPT | Performed by: INTERNAL MEDICINE

## 2025-02-17 PROCEDURE — 3008F BODY MASS INDEX DOCD: CPT | Performed by: INTERNAL MEDICINE

## 2025-02-17 PROCEDURE — 99396 PREV VISIT EST AGE 40-64: CPT | Performed by: INTERNAL MEDICINE

## 2025-02-17 PROCEDURE — 99214 OFFICE O/P EST MOD 30 MIN: CPT | Performed by: INTERNAL MEDICINE

## 2025-02-17 ASSESSMENT — ENCOUNTER SYMPTOMS
SORE THROAT: 0
DIARRHEA: 0
BRUISES/BLEEDS EASILY: 0
COUGH: 0
BLOOD IN STOOL: 0
UNEXPECTED WEIGHT CHANGE: 0
NUMBNESS: 1
ABDOMINAL PAIN: 0
ARTHRALGIAS: 0
FATIGUE: 0
PALPITATIONS: 0
SINUS PAIN: 0
WHEEZING: 0
HEADACHES: 0
DIFFICULTY URINATING: 0
FEVER: 0
DIZZINESS: 0

## 2025-02-17 ASSESSMENT — ANXIETY QUESTIONNAIRES
3. WORRYING TOO MUCH ABOUT DIFFERENT THINGS: NOT AT ALL
IF YOU CHECKED OFF ANY PROBLEMS ON THIS QUESTIONNAIRE, HOW DIFFICULT HAVE THESE PROBLEMS MADE IT FOR YOU TO DO YOUR WORK, TAKE CARE OF THINGS AT HOME, OR GET ALONG WITH OTHER PEOPLE: NOT DIFFICULT AT ALL
GAD7 TOTAL SCORE: 2
2. NOT BEING ABLE TO STOP OR CONTROL WORRYING: NOT AT ALL
7. FEELING AFRAID AS IF SOMETHING AWFUL MIGHT HAPPEN: NOT AT ALL
6. BECOMING EASILY ANNOYED OR IRRITABLE: SEVERAL DAYS
5. BEING SO RESTLESS THAT IT IS HARD TO SIT STILL: NOT AT ALL
4. TROUBLE RELAXING: NOT AT ALL
1. FEELING NERVOUS, ANXIOUS, OR ON EDGE: SEVERAL DAYS

## 2025-02-17 ASSESSMENT — PATIENT HEALTH QUESTIONNAIRE - PHQ9
1. LITTLE INTEREST OR PLEASURE IN DOING THINGS: NOT AT ALL
2. FEELING DOWN, DEPRESSED OR HOPELESS: NOT AT ALL
SUM OF ALL RESPONSES TO PHQ9 QUESTIONS 1 AND 2: 0

## 2025-02-17 NOTE — PROGRESS NOTES
Subjective   Patient ID: Lyle Oh is a 59 y.o. male who presents for Annual Exam.    Annual preventive visit  -Vaccinations patient declined vaccines  -Screen for colon cancer obtained 2020 repeat in 10 years 2030  - History of smoking patient quit smoking 2 years ago now  Needs screening for lung cancer low-dose CT scanning order placed today  -For depression negative  - Morbid obesity  I spent >15minutes minutes face to face with individial providing recommendations for nutrition choices and exercise plan to help achieve weight reduction.  - History of smoking  I discussed smoking history, patient meets criteria for lung cancer screening with low-dose CT scan. By using shared decision making we determined that patient would benefit from that screening including a discussion of benefits and harms of screening, follow-up diagnostic testing, over diagnosis, false positive  rate and total radiation exposure  I counseled the patient about the  importance of adhering to annual lung cancer low-dose CT screening, the impact of comorbidities and his  ability or willingness to undergo diagnosis and treatment if abnormality discovered. I provided patient an order for low-dose CT lung cancer screening.  Order placed    Follow-up  -Diabetes improved blood work reviewed hemoglobin A1c 6.4 maximize medical management continue with weight loss low-carb diet exercise  cOntinue with weight loss low-carb diet continue on Ozempic 2 mg weekly  -Numbness in lower extremity mild peripheral neuropathy maximize medical management multivitamins  -Coronary artery disease/non-STEMI developed right tooth continues purulent continue with current medication follow-up cardiology  No shortness of breath maximize medical management  - COPD compensated continue with current plan of treatment continue Trelegy  Counseled about nicotine cessation  -Patient quit smoking now  Counseled about low-dose CT scanning screening for lung  "cancer  Order placed today  - Hypercholesteremia controlled  - Obstructive sleep apnea compensated  Follow-up 6 months           Review of Systems   Constitutional:  Negative for fatigue, fever and unexpected weight change.   HENT:  Negative for congestion, ear discharge, ear pain, mouth sores, sinus pain and sore throat.    Eyes:  Negative for visual disturbance.   Respiratory:  Negative for cough and wheezing.    Cardiovascular:  Negative for chest pain, palpitations and leg swelling.   Gastrointestinal:  Negative for abdominal pain, blood in stool and diarrhea.   Genitourinary:  Negative for difficulty urinating.   Musculoskeletal:  Negative for arthralgias.   Skin:  Negative for rash.   Neurological:  Positive for numbness. Negative for dizziness and headaches.   Hematological:  Does not bruise/bleed easily.   Psychiatric/Behavioral:  Negative for behavioral problems.    All other systems reviewed and are negative.      Objective   Lab Results   Component Value Date    HGBA1C 6.4 (H) 02/03/2025      Lab Results   Component Value Date    WBC 7.8 02/03/2025    HGB 14.6 02/03/2025    HCT 44.4 02/03/2025     02/03/2025    CHOL 97 02/03/2025    TRIG 72 02/03/2025    HDL 47 02/03/2025    ALT 25 02/03/2025    AST 21 02/03/2025     02/03/2025    K 4.3 02/03/2025     02/03/2025    CREATININE 1.26 02/03/2025    BUN 14 02/03/2025    CO2 24 02/03/2025    TSH 3.60 02/03/2025    PSA 0.99 02/03/2025    INR 0.9 04/20/2024    HGBA1C 6.4 (H) 02/03/2025     par   BP (!) 140/110   Pulse 89   Temp 36.6 °C (97.8 °F)   Ht 1.778 m (5' 10\")   Wt 136 kg (299 lb 12.8 oz)   SpO2 99%   BMI 43.02 kg/m²     Physical Exam  Vitals and nursing note reviewed.   Constitutional:       Appearance: Normal appearance.   HENT:      Head: Normocephalic.      Nose: Nose normal.   Eyes:      Conjunctiva/sclera: Conjunctivae normal.      Pupils: Pupils are equal, round, and reactive to light.   Cardiovascular:      Rate and Rhythm: " Regular rhythm.   Pulmonary:      Effort: Pulmonary effort is normal.      Breath sounds: Normal breath sounds.   Abdominal:      General: Abdomen is flat.      Palpations: Abdomen is soft.   Musculoskeletal:      Cervical back: Neck supple.      Right lower leg: No edema.      Left lower leg: No edema.   Skin:     General: Skin is warm.   Neurological:      General: No focal deficit present.      Mental Status: He is oriented to person, place, and time.      Sensory: Sensory deficit (Bilateral lower extremity numbness up to mid tarsal both feet) present.   Psychiatric:         Mood and Affect: Mood normal.         Assessment/Plan   Lyle was seen today for annual exam.  Diagnoses and all orders for this visit:  History of smoking (Primary)  -     CT lung screening low dose; Future  Type 2 diabetes mellitus with obesity (Multi)  Chronic obstructive pulmonary disease with acute exacerbation (Multi)  Non-STEMI (non-ST elevated myocardial infarction) (Multi)  Annual physical exam  Other hyperlipidemia  Coronary artery disease involving native coronary artery of native heart, unspecified whether angina present  Sleep apnea, unspecified type  Diabetic polyneuropathy associated with type 2 diabetes mellitus (Multi)  Other orders  -     Follow Up In Primary Care - Established; Future   Annual preventive visit  -Vaccinations patient declined vaccines  -Screen for colon cancer obtained 2020 repeat in 10 years 2030  - History of smoking patient quit smoking 2 years ago now  Needs screening for lung cancer low-dose CT scanning order placed today  -For depression negative  - Morbid obesity  I spent >15minutes minutes face to face with individial providing recommendations for nutrition choices and exercise plan to help achieve weight reduction.  - History of smoking  I discussed smoking history, patient meets criteria for lung cancer screening with low-dose CT scan. By using shared decision making we determined that patient  would benefit from that screening including a discussion of benefits and harms of screening, follow-up diagnostic testing, over diagnosis, false positive  rate and total radiation exposure  I counseled the patient about the  importance of adhering to annual lung cancer low-dose CT screening, the impact of comorbidities and his  ability or willingness to undergo diagnosis and treatment if abnormality discovered. I provided patient an order for low-dose CT lung cancer screening.  Order placed    Follow-up  -Diabetes improved blood work reviewed hemoglobin A1c 6.4 maximize medical management continue with weight loss low-carb diet exercise  cOntinue with weight loss low-carb diet continue on Ozempic 2 mg weekly  -Numbness in lower extremity mild peripheral neuropathy maximize medical management multivitamins  -Coronary artery disease/non-STEMI developed right tooth continues purulent continue with current medication follow-up cardiology  No shortness of breath maximize medical management  - COPD compensated continue with current plan of treatment continue Trelegy  Counseled about nicotine cessation  -Patient quit smoking now  Counseled about low-dose CT scanning screening for lung cancer  Order placed today  - Hypercholesteremia controlled  - Obstructive sleep apnea compensated  Follow-up 6 months

## 2025-02-18 ENCOUNTER — APPOINTMENT (OUTPATIENT)
Dept: PRIMARY CARE | Facility: CLINIC | Age: 60
End: 2025-02-18
Payer: COMMERCIAL

## 2025-02-19 ENCOUNTER — APPOINTMENT (OUTPATIENT)
Dept: PHARMACY | Facility: HOSPITAL | Age: 60
End: 2025-02-19
Payer: COMMERCIAL

## 2025-02-19 DIAGNOSIS — E11.69 TYPE 2 DIABETES MELLITUS WITH OBESITY (MULTI): ICD-10-CM

## 2025-02-19 DIAGNOSIS — E78.49 OTHER HYPERLIPIDEMIA: ICD-10-CM

## 2025-02-19 DIAGNOSIS — E66.9 TYPE 2 DIABETES MELLITUS WITH OBESITY (MULTI): Primary | ICD-10-CM

## 2025-02-19 DIAGNOSIS — I25.10 CORONARY ARTERY DISEASE INVOLVING NATIVE CORONARY ARTERY OF NATIVE HEART, UNSPECIFIED WHETHER ANGINA PRESENT: ICD-10-CM

## 2025-02-19 DIAGNOSIS — E66.9 TYPE 2 DIABETES MELLITUS WITH OBESITY (MULTI): ICD-10-CM

## 2025-02-19 DIAGNOSIS — E11.69 TYPE 2 DIABETES MELLITUS WITH OBESITY (MULTI): Primary | ICD-10-CM

## 2025-02-19 RX ORDER — SEMAGLUTIDE 2.68 MG/ML
2 INJECTION, SOLUTION SUBCUTANEOUS
Qty: 9 ML | Refills: 3 | Status: SHIPPED | OUTPATIENT
Start: 2025-02-23

## 2025-02-19 NOTE — PROGRESS NOTES
[facility] ([facility])    Lyle Oh is a 59 y.o. male referred to Clinical Pharmacy Team to complete a comprehensive medication review (CMR) with a pharmacist as part of the Value Based Insurance Design (VBID) diabetes program.  Pharmacy team may also provide assistance in diabetes management per discussion with referring provider and/or endocrinology. Referring Provider: Kiet Rios MD    Does patient follow with Endocrinology: No    Subjective   Allergies   Allergen Reactions    Statins-Hmg-Coa Reductase Inhibitors Other     Joint pain w/ atorvastatin and rosuvastatin       [facility]  [address] [facility] [address]  Phone: [phone] Fax: [phone]    [id number] - [place], [place] - [address]  [address] [facility] [address]  Phone: [phone] Fax: [phone]    [facility]  [address] [facility] [address]  Phone: [phone] Fax: [phone]    Diabetes  He presents for his follow-up diabetic visit. He has type 2 diabetes mellitus. His disease course has been stable. There are no hypoglycemic complications. Risk factors for coronary artery disease include diabetes mellitus, obesity, male sex, hypertension and dyslipidemia. His weight is decreasing steadily. An ACE inhibitor/angiotensin II receptor blocker is being taken.     Pertinent PMH Review:  PMH of Pancreatitis: No  PMH of Retinopathy: No  PMH of Urinary Tract Infections: No - hx kidney stone  PMH of MTC: No    HOME MONITORING  Monitoring Device: Fingerstick glucometer  Monitoring Frequency: PRN   Current home BG readings: not currently mon     Objective     BP Readings from Last 6 Encounters:   02/17/25 (!) 140/110   08/12/24 (!) 136/98   05/15/24 (!) 146/102   05/07/24 118/82   04/23/24 111/80   04/04/24 132/78      Daily Weight  02/17/25 : 136 kg (299 lb 12.8 oz)  08/12/24 : (!) 151 kg (331 lb 12.8 oz)  05/15/24 : 147 kg (324 lb 9.6  "oz)  05/07/24 : 148 kg (325 lb 6.4 oz)  04/23/24 : 148 kg (325 lb 6.4 oz)  04/04/24 : (!) 150 kg (330 lb 12.8 oz)     Estimated body mass index is 43.02 kg/m² as calculated from the following:    Height as of 2/17/25: 1.778 m (5' 10\").    Weight as of 2/17/25: 136 kg (299 lb 12.8 oz).   LAB  Lab Results   Component Value Date    BILITOT 0.6 02/03/2025    CALCIUM 9.8 02/03/2025    CO2 24 02/03/2025     02/03/2025    CREATININE 1.26 02/03/2025    GLUCOSE 116 (H) 02/03/2025    ALKPHOS 48 02/03/2025    K 4.3 02/03/2025    PROT 7.4 02/03/2025     02/03/2025    AST 21 02/03/2025    ALT 25 02/03/2025    BUN 14 02/03/2025    ANIONGAP 12 02/03/2025    MG 2.3 02/03/2025    ALBUMIN 4.6 02/03/2025    GFRMALE >90 06/14/2023     Lab Results   Component Value Date    TRIG 72 02/03/2025    CHOL 97 02/03/2025    LDLCALC 35 02/03/2025    HDL 47 02/03/2025     Lab Results   Component Value Date    HGBA1C 6.4 (H) 02/03/2025       Current Outpatient Medications on File Prior to Visit   Medication Sig Dispense Refill    aclidinium (Tudorza Pressair) 400 mcg/actuation inhaler Inhale 1 puff  by mouth 2 times a day. 3 each 1    amLODIPine (Norvasc) 10 mg tablet Take 1 tablet (10 mg) by mouth once daily. 30 tablet 11    aspirin 81 mg EC tablet Take 1 tablet (81 mg) by mouth once daily.      buPROPion XL (Wellbutrin XL) 150 mg 24 hr tablet TAKE 1 TABLET BY MOUTH EVERY MORNING 90 tablet 1    carvedilol (Coreg) 25 mg tablet Take 1 tablet (25 mg) by mouth 2 times a day with meals. 60 tablet 11    chlorhexidine (Peridex) 0.12 % solution Use to rinse mouth (swish for 30 seconds and then expectorate) twice a day, starting the day after surgery. 473 mL 0    cholecalciferol (Vitamin D-3) 5,000 Units tablet Take 1 tablet (5,000 Units) by mouth once every day.      evolocumab (Repatha SureClick) 140 mg/mL injection Inject 1 mL (140 mg) under the skin every 14 (fourteen) days. 2 mL 11    ezetimibe (Zetia) 10 mg tablet Take 1 tablet (10 mg) " by mouth once daily at bedtime. 30 tablet 11    levothyroxine (Synthroid, Levoxyl) 50 mcg tablet Take 1.5 tablets (75mcg) by mouth once daily in the morning. Take before meals. 135 tablet 1    olmesartan (BENIcar) 40 mg tablet Take 1 tablet (40 mg) by mouth once daily. 30 tablet 11    sildenafil (Viagra) 100 mg tablet Take 1 tablet (100 mg) by mouth if needed for erectile dysfunction. 1 hour before needed 10 tablet 2    ticagrelor (Brilinta) 90 mg tablet Take 1 tablet (90 mg) by mouth 2 times a day. 60 tablet 11    topiramate (Topamax) 25 mg tablet Take 2 tablets (50 mg) by mouth once daily.      Ventolin HFA 90 mcg/actuation inhaler INHALE 2 PUFFS EVERY 4 HOURS IF NEEDED FOR WHEEZING. 4-6 HOURS AS NEEDED 54 g 1    vit B/folic/choline/inos/herbs (ULTRA B-100 COMPLEX, FOODBASE, ORAL) Take 1 tablet by mouth 1 (one) time each day. As directed      [DISCONTINUED] semaglutide (Ozempic) 2 mg/dose (8 mg/3 mL) pen injector Inject 2 mg under the skin 1 (one) time per week. 9 mL 1     No current facility-administered medications on file prior to visit.      MEDICATION RECONCILIATION  Added: none  Changed: none  Removed: none    Drug Interactions:  None warranting change in therapy at this time    CURRENT DIABETES PHARMACOTHERAPY  Ozempic 2 mg injected subcutaneously once weekly    PREVENTATIVE PHARMACOTHERAPY  Statin? no - documented statin intolerance, on ezetimibe + Repatha  LDL: at goal (< 70 mg/dL)  ACE-I/ARB? yes  BP: not at goal, < 130/80  UACR: normal (< 30 mg/g)  Aspirin?  yes - DAPT: ASA + Brilinta    Assessment/Plan   Problem List Items Addressed This Visit       CAD (coronary artery disease) (Chronic)    Relevant Medications    semaglutide (Ozempic) 2 mg/dose (8 mg/3 mL) pen injector (Start on 2/23/2025)    Type 2 diabetes mellitus with obesity (Multi) - Primary    Relevant Medications    semaglutide (Ozempic) 2 mg/dose (8 mg/3 mL) pen injector (Start on 2/23/2025)    Other Relevant Orders    Referral to Clinical  Pharmacy    Hyperlipidemia (Chronic)    Relevant Medications    semaglutide (Ozempic) 2 mg/dose (8 mg/3 mL) pen injector (Start on 2/23/2025)      Diabetes:  Patient's Type 2 diabetes is well controlled with most recent A1c of 6.4% on 2/3/25 (Goal < 7%). Overall, doing well on current regimen, denies side effects. No changes recommended at today's visit.  Continue:  Ozempic 2 mg injected subcutaneously once weekly  Comorbidity/Complication Regimen: appropriate    Comprehensive Medication Review:  Reviewed all medications on patient medication list in EMR. Discussed indication, administration, MOA and possible side effects to medications. Answered all patient questions and concerns.   Patient denies side effects with medications.   Adherence is expected to be Good.   Additional concerns with medication list: none    VBID Employee Diabetes Program Enrollment: Renewal  Patient enrolled in  Employee diabetes program for $0 co-pays on diabetes medications/supplies. Renewal should be active in 2-4 weeks.  Requested VBID enrollment date: 02/19/25  PharmD Management Level: Level 2   Pharmacy fill location: [facility]    Follow up: 10-12 months for renewal    Lori TranD     Continue all meds under the continuation of care with the referring provider and clinical pharmacy team. Patient/Caregiver was informed they may decline to participate or withdraw from participation in pharmacy services at any time.

## 2025-02-19 NOTE — PATIENT INSTRUCTIONS
Thank you for entrusting your care to the Clinical Pharmacy Team! We look forward to seeing you again soon.  Please call your clinical pharmacist with any questions or concerns.    You are enrolled in the [facility] Employee Value Based Insurance Design (VBID) program. This program allows you to receive for $0 co-pays on diabetes medications/supplies.  To maintain your eligibility for this program, you must:  Maintain  employee insurance coverage  Fill prescriptions at a  pharmacy for pick-up or home delivery  Complete a visit with a clinical pharmacist at least once annually    Betzaida Aguilera, PharmD  P: [phone]    To schedule an appointment, please contact:  P: [phone]

## 2025-02-27 ENCOUNTER — PHARMACY VISIT (OUTPATIENT)
Dept: PHARMACY | Facility: CLINIC | Age: 60
End: 2025-02-27
Payer: COMMERCIAL

## 2025-02-27 PROCEDURE — RXMED WILLOW AMBULATORY MEDICATION CHARGE

## 2025-04-04 ENCOUNTER — PHARMACY VISIT (OUTPATIENT)
Dept: PHARMACY | Facility: CLINIC | Age: 60
End: 2025-04-04
Payer: COMMERCIAL

## 2025-04-04 PROCEDURE — RXMED WILLOW AMBULATORY MEDICATION CHARGE

## 2025-04-14 DIAGNOSIS — I21.4 NSTEMI (NON-ST ELEVATED MYOCARDIAL INFARCTION) (MULTI): ICD-10-CM

## 2025-04-14 DIAGNOSIS — F41.8 DEPRESSION WITH ANXIETY: Chronic | ICD-10-CM

## 2025-04-14 DIAGNOSIS — I10 HYPERTENSION, UNCONTROLLED: ICD-10-CM

## 2025-04-14 DIAGNOSIS — E03.9 ACQUIRED HYPOTHYROIDISM: Chronic | ICD-10-CM

## 2025-04-14 PROCEDURE — RXMED WILLOW AMBULATORY MEDICATION CHARGE

## 2025-04-14 RX ORDER — EZETIMIBE 10 MG/1
10 TABLET ORAL NIGHTLY
Qty: 30 TABLET | Refills: 11 | Status: SHIPPED | OUTPATIENT
Start: 2025-04-14 | End: 2026-04-09

## 2025-04-14 RX ORDER — CARVEDILOL 25 MG/1
25 TABLET ORAL
Qty: 60 TABLET | Refills: 11 | Status: SHIPPED | OUTPATIENT
Start: 2025-04-14 | End: 2026-04-09

## 2025-04-14 RX ORDER — BUPROPION HYDROCHLORIDE 150 MG/1
150 TABLET ORAL
Qty: 90 TABLET | Refills: 1 | Status: SHIPPED | OUTPATIENT
Start: 2025-04-14 | End: 2026-04-14

## 2025-04-14 RX ORDER — LEVOTHYROXINE SODIUM 50 UG/1
75 TABLET ORAL
Qty: 135 TABLET | Refills: 1 | Status: SHIPPED | OUTPATIENT
Start: 2025-04-14

## 2025-04-14 RX ORDER — OLMESARTAN MEDOXOMIL 40 MG/1
40 TABLET ORAL DAILY
Qty: 30 TABLET | Refills: 11 | Status: SHIPPED | OUTPATIENT
Start: 2025-04-14 | End: 2026-04-09

## 2025-04-15 ENCOUNTER — PHARMACY VISIT (OUTPATIENT)
Dept: PHARMACY | Facility: CLINIC | Age: 60
End: 2025-04-15
Payer: COMMERCIAL

## 2025-04-15 PROCEDURE — RXMED WILLOW AMBULATORY MEDICATION CHARGE

## 2025-04-28 ENCOUNTER — PHARMACY VISIT (OUTPATIENT)
Dept: PHARMACY | Facility: CLINIC | Age: 60
End: 2025-04-28
Payer: COMMERCIAL

## 2025-04-28 PROCEDURE — RXMED WILLOW AMBULATORY MEDICATION CHARGE

## 2025-05-06 ENCOUNTER — PHARMACY VISIT (OUTPATIENT)
Dept: PHARMACY | Facility: CLINIC | Age: 60
End: 2025-05-06
Payer: COMMERCIAL

## 2025-05-06 PROCEDURE — RXMED WILLOW AMBULATORY MEDICATION CHARGE

## 2025-05-06 RX ORDER — CLOPIDOGREL BISULFATE 75 MG/1
75 TABLET ORAL DAILY
Qty: 90 TABLET | Refills: 3 | OUTPATIENT
Start: 2025-05-06

## 2025-05-27 PROCEDURE — RXMED WILLOW AMBULATORY MEDICATION CHARGE

## 2025-05-28 ENCOUNTER — PHARMACY VISIT (OUTPATIENT)
Dept: PHARMACY | Facility: CLINIC | Age: 60
End: 2025-05-28
Payer: COMMERCIAL

## 2025-06-16 ENCOUNTER — HOSPITAL ENCOUNTER (OUTPATIENT)
Dept: RADIOLOGY | Facility: HOSPITAL | Age: 60
Discharge: HOME | End: 2025-06-16
Payer: COMMERCIAL

## 2025-06-16 ENCOUNTER — OFFICE VISIT (OUTPATIENT)
Dept: PRIMARY CARE | Facility: CLINIC | Age: 60
End: 2025-06-16
Payer: COMMERCIAL

## 2025-06-16 VITALS
HEART RATE: 86 BPM | OXYGEN SATURATION: 100 % | BODY MASS INDEX: 42.15 KG/M2 | SYSTOLIC BLOOD PRESSURE: 150 MMHG | DIASTOLIC BLOOD PRESSURE: 96 MMHG | HEIGHT: 70 IN | TEMPERATURE: 97.4 F | WEIGHT: 294.4 LBS

## 2025-06-16 DIAGNOSIS — E11.69 TYPE 2 DIABETES MELLITUS WITH OBESITY (MULTI): ICD-10-CM

## 2025-06-16 DIAGNOSIS — M25.532 PAIN AND SWELLING OF LEFT WRIST: Primary | ICD-10-CM

## 2025-06-16 DIAGNOSIS — M25.532 PAIN AND SWELLING OF LEFT WRIST: ICD-10-CM

## 2025-06-16 DIAGNOSIS — I25.10 CORONARY ARTERY DISEASE INVOLVING NATIVE CORONARY ARTERY OF NATIVE HEART WITHOUT ANGINA PECTORIS: Chronic | ICD-10-CM

## 2025-06-16 DIAGNOSIS — M25.432 PAIN AND SWELLING OF LEFT WRIST: ICD-10-CM

## 2025-06-16 DIAGNOSIS — E66.9 TYPE 2 DIABETES MELLITUS WITH OBESITY (MULTI): ICD-10-CM

## 2025-06-16 DIAGNOSIS — M25.432 PAIN AND SWELLING OF LEFT WRIST: Primary | ICD-10-CM

## 2025-06-16 PROCEDURE — 3077F SYST BP >= 140 MM HG: CPT | Performed by: INTERNAL MEDICINE

## 2025-06-16 PROCEDURE — 3080F DIAST BP >= 90 MM HG: CPT | Performed by: INTERNAL MEDICINE

## 2025-06-16 PROCEDURE — 1036F TOBACCO NON-USER: CPT | Performed by: INTERNAL MEDICINE

## 2025-06-16 PROCEDURE — 73110 X-RAY EXAM OF WRIST: CPT | Mod: LT

## 2025-06-16 PROCEDURE — 4010F ACE/ARB THERAPY RXD/TAKEN: CPT | Performed by: INTERNAL MEDICINE

## 2025-06-16 PROCEDURE — 99214 OFFICE O/P EST MOD 30 MIN: CPT | Performed by: INTERNAL MEDICINE

## 2025-06-16 PROCEDURE — 3008F BODY MASS INDEX DOCD: CPT | Performed by: INTERNAL MEDICINE

## 2025-06-16 PROCEDURE — 73110 X-RAY EXAM OF WRIST: CPT | Mod: LEFT SIDE | Performed by: RADIOLOGY

## 2025-06-16 ASSESSMENT — ENCOUNTER SYMPTOMS
ABDOMINAL PAIN: 0
COUGH: 0
BLOOD IN STOOL: 0
HEADACHES: 0
DIZZINESS: 0
UNEXPECTED WEIGHT CHANGE: 0
PALPITATIONS: 0
ARTHRALGIAS: 0
JOINT SWELLING: 1
DIFFICULTY URINATING: 0
SORE THROAT: 0
FATIGUE: 0
DIARRHEA: 0
SINUS PAIN: 0
BRUISES/BLEEDS EASILY: 0
FEVER: 0
WHEEZING: 0

## 2025-06-16 NOTE — PROGRESS NOTES
"Subjective   Patient ID: Lyle Oh is a 59 y.o. male who presents for Wrist Pain and Left Without Being Seen (Left. Been having pain for 2 weeks. ).    - Patient had handed trauma about 2 weeks ago when he noted a doorknob  Left wrist swelling left wrist to be determined continue with wrist support Tylenol as needed check x-ray today  :  -Coronary artery disease stable  - Diabetes controlled  -Hypoxemia controlled continue with current medication follow-up lab results close    Wrist Pain   Pertinent negatives include no fever.          Review of Systems   Constitutional:  Negative for fatigue, fever and unexpected weight change.   HENT:  Negative for congestion, ear discharge, ear pain, mouth sores, sinus pain and sore throat.    Eyes:  Negative for visual disturbance.   Respiratory:  Negative for cough and wheezing.    Cardiovascular:  Negative for chest pain, palpitations and leg swelling.   Gastrointestinal:  Negative for abdominal pain, blood in stool and diarrhea.   Genitourinary:  Negative for difficulty urinating.   Musculoskeletal:  Positive for joint swelling. Negative for arthralgias.   Skin:  Negative for rash.   Neurological:  Negative for dizziness and headaches.   Hematological:  Does not bruise/bleed easily.   Psychiatric/Behavioral:  Negative for behavioral problems.    All other systems reviewed and are negative.      Objective   Lab Results   Component Value Date    HGBA1C 6.4 (H) 02/03/2025      BP (!) 150/96   Pulse 86   Temp 36.3 °C (97.4 °F)   Ht 1.778 m (5' 10\")   Wt 134 kg (294 lb 6.4 oz)   SpO2 100%   BMI 42.24 kg/m²     Physical Exam  Vitals and nursing note reviewed.   Constitutional:       Appearance: Normal appearance.   HENT:      Head: Normocephalic.      Nose: Nose normal.   Eyes:      Conjunctiva/sclera: Conjunctivae normal.      Pupils: Pupils are equal, round, and reactive to light.   Cardiovascular:      Rate and Rhythm: Regular rhythm.   Pulmonary:      Effort: " Pulmonary effort is normal.      Breath sounds: Normal breath sounds.   Abdominal:      General: Abdomen is flat.      Palpations: Abdomen is soft.   Musculoskeletal:         General: Tenderness (Left wrist tenderness and swelling) and deformity present.      Cervical back: Neck supple.   Skin:     General: Skin is warm.   Neurological:      General: No focal deficit present.      Mental Status: He is oriented to person, place, and time.   Psychiatric:         Mood and Affect: Mood normal.         Assessment/Plan   Lyle was seen today for wrist pain and left without being seen.  Diagnoses and all orders for this visit:  Pain and swelling of left wrist (Primary)  -     XR wrist left 3+ views; Future  Coronary artery disease involving native coronary artery of native heart without angina pectoris  Type 2 diabetes mellitus with obesity (Multi)   - Patient had handed trauma about 2 weeks ago when he noted a doorknob  Left wrist swelling left wrist to be determined continue with wrist support Tylenol as needed check x-ray today  :  -Coronary artery disease stable  - Diabetes controlled  -Hypoxemia controlled continue with current medication follow-up lab results close

## 2025-06-18 DIAGNOSIS — S52.615G CLOSED NONDISPLACED FRACTURE OF STYLOID PROCESS OF LEFT ULNA WITH DELAYED HEALING, SUBSEQUENT ENCOUNTER: Primary | ICD-10-CM

## 2025-06-26 ENCOUNTER — OFFICE VISIT (OUTPATIENT)
Dept: ORTHOPEDIC SURGERY | Facility: CLINIC | Age: 60
End: 2025-06-26
Payer: COMMERCIAL

## 2025-06-26 ENCOUNTER — HOSPITAL ENCOUNTER (OUTPATIENT)
Dept: RADIOLOGY | Facility: CLINIC | Age: 60
Discharge: HOME | End: 2025-06-26
Payer: COMMERCIAL

## 2025-06-26 DIAGNOSIS — S69.92XA LEFT WRIST INJURY, INITIAL ENCOUNTER: ICD-10-CM

## 2025-06-26 DIAGNOSIS — S52.615G CLOSED NONDISPLACED FRACTURE OF STYLOID PROCESS OF LEFT ULNA WITH DELAYED HEALING, SUBSEQUENT ENCOUNTER: ICD-10-CM

## 2025-06-26 DIAGNOSIS — J44.1 CHRONIC OBSTRUCTIVE PULMONARY DISEASE WITH ACUTE EXACERBATION (MULTI): Chronic | ICD-10-CM

## 2025-06-26 PROCEDURE — 99203 OFFICE O/P NEW LOW 30 MIN: CPT | Performed by: ORTHOPAEDIC SURGERY

## 2025-06-26 PROCEDURE — 99204 OFFICE O/P NEW MOD 45 MIN: CPT | Performed by: ORTHOPAEDIC SURGERY

## 2025-06-26 PROCEDURE — 73110 X-RAY EXAM OF WRIST: CPT | Mod: LT

## 2025-06-26 PROCEDURE — 4010F ACE/ARB THERAPY RXD/TAKEN: CPT | Performed by: ORTHOPAEDIC SURGERY

## 2025-06-26 RX ORDER — ACLIDINIUM BROMIDE 400 UG/1
1 POWDER, METERED RESPIRATORY (INHALATION)
Qty: 3 EACH | Refills: 1 | OUTPATIENT
Start: 2025-06-26

## 2025-06-26 ASSESSMENT — PAIN - FUNCTIONAL ASSESSMENT: PAIN_FUNCTIONAL_ASSESSMENT: 0-10

## 2025-06-26 ASSESSMENT — PAIN SCALES - GENERAL: PAINLEVEL_OUTOF10: 4

## 2025-06-29 NOTE — PROGRESS NOTES
History of Present Illness:  Chief Complaint   Patient presents with    Left Wrist - Injury       Patient presents today for evaluation of left wrist injury that occurred about 3 weeks ago.  He recalls hitting his wrist on a doorknob and has had some constant aching and pain about the ulnar wrist since that time.  Pain is worse with rotation and better with rest.  No numbness or tingling.  No previous wrist injuries.      Medical History[1]    Medication Documentation Review Audit       Reviewed by Brianna Cat CMA (Medical Assistant) on 25 at 0937      Medication Order Taking? Sig Documenting Provider Last Dose Status   aclidinium (Tudorza Pressair) 400 mcg/actuation inhaler [id number]  Inhale 1 puff  by mouth 2 times a day. Kiet Rios MD  Active   amLODIPine (Norvasc) 10 mg tablet [id number] No Take 1 tablet (10 mg) by mouth once daily. Karon Lam, APRN-CNP Taking  25 2359   aspirin 81 mg EC tablet [id number]  Take 1 tablet (81 mg) by mouth once daily. Historical Provider, MD  Active   buPROPion XL (Wellbutrin XL) 150 mg 24 hr tablet [id number]  TAKE 1 TABLET BY MOUTH EVERY MORNING Kiet Rios MD  Active   carvedilol (Coreg) 25 mg tablet [id number]  Take 1 tablet (25 mg) by mouth 2 times a day with meals. Kiet Rios MD  Active   chlorhexidine (Peridex) 0.12 % solution [id number] No Use to rinse mouth (swish for 30 seconds and then expectorate) twice a day, starting the day after surgery.  Taking Active   cholecalciferol (Vitamin D-3) 5,000 Units tablet [id number] No Take 1 tablet (125 mcg) by mouth once every day. Historical Provider, MD Taking Active   clopidogrel (Plavix) 75 mg tablet [id number]  Take 1 Tablet by mouth Daily   Active   evolocumab (Repatha SureClick) 140 mg/mL injection [id number]  Inject 1 mL (140 mg) under the skin every 14 (fourteen) days. Kiet Rios MD  Active   ezetimibe (Zetia) 10 mg tablet [id number]  Take 1 tablet (10 mg) by mouth once daily at  bedtime. Kiet Rios MD  Active   levothyroxine (Synthroid, Levoxyl) 50 mcg tablet [id number]  Take 1.5 tablets (75mcg) by mouth once daily in the morning. Take before meals. Kiet Rios MD  Active   olmesartan (BENIcar) 40 mg tablet [id number]  Take 1 tablet (40 mg) by mouth once daily. Kiet Rios MD  Active   semaglutide (Ozempic) 2 mg/dose (8 mg/3 mL) pen injector [id number]  Inject 2 mg under the skin 1 (one) time per week. Kiet Rios MD  Active   sildenafil (Viagra) 100 mg tablet [id number] No Take 1 tablet (100 mg) by mouth if needed for erectile dysfunction. 1 hour before needed Kiet Rios MD Taking Active   ticagrelor (Brilinta) 90 mg tablet [id number]  Take 1 tablet (90 mg) by mouth 2 times a day. Kiet Rios MD  Active   topiramate (Topamax) 25 mg tablet [id number] No Take 2 tablets (50 mg) by mouth once daily. Historical Provider, MD Taking Active   Ventolin HFA 90 mcg/actuation inhaler [id number]  INHALE 2 PUFFS EVERY 4 HOURS IF NEEDED FOR WHEEZING. 4-6 HOURS AS NEEDED Kiet Rios MD  Active   vit B/folic/choline/inos/herbs (ULTRA B-100 COMPLEX, FOODBASE, ORAL) [id number] No Take 1 tablet by mouth 1 (one) time each day. As directed Historical Provider, MD Taking Active                    RX Allergies[2]    Social History     Socioeconomic History    Marital status:      Spouse name: Not on file    Number of children: Not on file    Years of education: Not on file    Highest education level: Not on file   Occupational History    Not on file   Tobacco Use    Smoking status: Former     Types: Cigarettes    Smokeless tobacco: Never   Substance and Sexual Activity    Alcohol use: Not Currently    Drug use: Not Currently    Sexual activity: Defer   Other Topics Concern    Not on file   Social History Narrative    Not on file     Social Drivers of Health     Financial Resource Strain: Low Risk  (4/22/2024)    Overall Financial Resource Strain (CARDIA)     Difficulty of  Paying Living Expenses: Not hard at all   Food Insecurity: Not on file   Transportation Needs: No Transportation Needs (4/22/2024)    PRAPARE - Transportation     Lack of Transportation (Medical): No     Lack of Transportation (Non-Medical): No   Physical Activity: Not on file   Stress: Not on file   Social Connections: Not on file   Intimate Partner Violence: Not on file   Housing Stability: Low Risk  (4/22/2024)    Housing Stability Vital Sign     Unable to Pay for Housing in the Last Year: No     Number of Places Lived in the Last Year: 1     Unstable Housing in the Last Year: No       Surgical History[3]     Review of Systems   GENERAL: Negative for malaise, significant weight loss, fever  MUSCULOSKELETAL: see HPI  NEURO:  Negative     Physical Examination  Constitutional: Appears well-developed and well-nourished.  Head: Normocephalic and atraumatic.  Eyes: EOMI grossly  Cardiovascular: Intact distal pulses.   Respiratory: Effort normal. No respiratory distress.  Neurologic: Alert and oriented to person, place, and time.  Skin: Skin is warm and dry.  Hematologic / Lymphatic: No lymphedema, lymphangitis.  Psychiatric: normal mood and affect. Behavior is normal.   Musculoskeletal:  Left wrist: Tenderness about ulnar styloid. 80/80 degrees pronation/supination with some soreness at extremes of motion.  60/60 degrees wrist flexion/extension.  Sensation intact distally.  5/5 thumb abduction/finger abduction.  No tenderness about distal radius metaphyseal region.  Negative DRUJ/LIZBETH/scaphoid shift.     Radiographs:  Left wrist radiographs ordered and available for my review/interpretation demonstrate nondisplaced ulnar styloid fracture, Similar appearance to prior radiographs.     Assessment:  Patient with nondisplaced left wrist ulnar styloid fracture     Plan:  Nature of the diagnosis was discussed with the patient.  We discussed recommendation for bracing and avoidance of forced pronation/supination.  We also  discussed recommendation for use of anti-inflammatories as needed for pain management.  Follow-up in 4 weeks with new left wrist radiographs and continue progression of activities at that time.                [1]   Past Medical History:  Diagnosis Date    Body mass index (BMI)40.0-44.9, adult 09/09/2021    Body mass index (BMI) of 40.0 to 44.9 in adult    Coronary artery disease     Diabetes mellitus (Multi)     Hyperlipidemia     Hypertension     Morbid (severe) obesity due to excess calories (Multi) 09/09/2021    Obesity, Class III, BMI 40-49.9 (morbid obesity)    Obesity, unspecified 10/18/2019    Obesity (BMI 30-39.9)    Personal history of other diseases of the circulatory system 07/25/2017    History of uncontrolled hypertension    Personal history of other diseases of the musculoskeletal system and connective tissue 12/03/2020    History of muscle pain    Personal history of other diseases of the respiratory system 03/19/2019    History of acute bronchitis   [2]   Allergies  Allergen Reactions    Statins-Hmg-Coa Reductase Inhibitors Other     Joint pain w/ atorvastatin and rosuvastatin   [3]   Past Surgical History:  Procedure Laterality Date    CARDIAC CATHETERIZATION      CARDIAC CATHETERIZATION N/A 4/22/2024    Procedure: Left Heart Cath, No LV;  Surgeon: Nilo Camarena MD;  Location: [facility];  Service: Cardiovascular;  Laterality: N/A;    CARDIAC CATHETERIZATION N/A 4/22/2024    Procedure: PCI WAGNER Stent- Coronary;  Surgeon: Nilo Camarena MD;  Location: [facility] Cardiac Cath Lab;  Service: Cardiovascular;  Laterality: N/A;    CORONARY ANGIOPLASTY      OTHER SURGICAL HISTORY  06/02/2014    Periph Nerve Block Mini-Taylor (Tailor's Bunion) Right Foot

## 2025-07-14 ENCOUNTER — PHARMACY VISIT (OUTPATIENT)
Dept: PHARMACY | Facility: CLINIC | Age: 60
End: 2025-07-14
Payer: COMMERCIAL

## 2025-07-14 PROCEDURE — RXMED WILLOW AMBULATORY MEDICATION CHARGE

## 2025-07-28 DIAGNOSIS — J44.1 CHRONIC OBSTRUCTIVE PULMONARY DISEASE WITH ACUTE EXACERBATION (MULTI): Chronic | ICD-10-CM

## 2025-07-28 DIAGNOSIS — N52.9 ERECTILE DYSFUNCTION, UNSPECIFIED ERECTILE DYSFUNCTION TYPE: Chronic | ICD-10-CM

## 2025-07-28 PROCEDURE — RXMED WILLOW AMBULATORY MEDICATION CHARGE

## 2025-07-28 RX ORDER — ACLIDINIUM BROMIDE 400 UG/1
1 POWDER, METERED RESPIRATORY (INHALATION)
Qty: 3 EACH | Refills: 1 | Status: SHIPPED | OUTPATIENT
Start: 2025-07-28

## 2025-07-28 RX ORDER — SILDENAFIL 100 MG/1
100 TABLET, FILM COATED ORAL AS NEEDED
Qty: 10 TABLET | Refills: 2 | Status: SHIPPED | OUTPATIENT
Start: 2025-07-28

## 2025-07-29 ENCOUNTER — PHARMACY VISIT (OUTPATIENT)
Dept: PHARMACY | Facility: CLINIC | Age: 60
End: 2025-07-29
Payer: COMMERCIAL

## 2025-07-29 PROCEDURE — RXMED WILLOW AMBULATORY MEDICATION CHARGE

## 2025-08-18 ENCOUNTER — APPOINTMENT (OUTPATIENT)
Dept: PRIMARY CARE | Facility: CLINIC | Age: 60
End: 2025-08-18
Payer: COMMERCIAL

## 2025-08-18 VITALS
BODY MASS INDEX: 42.56 KG/M2 | SYSTOLIC BLOOD PRESSURE: 120 MMHG | DIASTOLIC BLOOD PRESSURE: 80 MMHG | WEIGHT: 296.6 LBS | OXYGEN SATURATION: 98 % | HEART RATE: 87 BPM | TEMPERATURE: 97.5 F

## 2025-08-18 DIAGNOSIS — G47.33 OBSTRUCTIVE SLEEP APNEA OF ADULT: ICD-10-CM

## 2025-08-18 DIAGNOSIS — J43.8 OTHER EMPHYSEMA (MULTI): Chronic | ICD-10-CM

## 2025-08-18 DIAGNOSIS — F41.8 DEPRESSION WITH ANXIETY: Chronic | ICD-10-CM

## 2025-08-18 DIAGNOSIS — E66.01 MORBID OBESITY WITH BMI OF 40.0-44.9, ADULT (MULTI): ICD-10-CM

## 2025-08-18 DIAGNOSIS — E78.49 OTHER HYPERLIPIDEMIA: Primary | Chronic | ICD-10-CM

## 2025-08-18 DIAGNOSIS — I25.10 CORONARY ARTERY DISEASE INVOLVING NATIVE CORONARY ARTERY OF NATIVE HEART, UNSPECIFIED WHETHER ANGINA PRESENT: Chronic | ICD-10-CM

## 2025-08-18 DIAGNOSIS — E03.9 ACQUIRED HYPOTHYROIDISM: Chronic | ICD-10-CM

## 2025-08-18 DIAGNOSIS — E11.69 TYPE 2 DIABETES MELLITUS WITH OBESITY (MULTI): ICD-10-CM

## 2025-08-18 DIAGNOSIS — E66.9 TYPE 2 DIABETES MELLITUS WITH OBESITY (MULTI): ICD-10-CM

## 2025-08-18 DIAGNOSIS — E87.6 HYPOKALEMIA: Chronic | ICD-10-CM

## 2025-08-18 DIAGNOSIS — Z00.00 ANNUAL PHYSICAL EXAM: ICD-10-CM

## 2025-08-18 PROBLEM — Z98.84 BARIATRIC SURGERY STATUS: Chronic | Status: RESOLVED | Noted: 2023-01-28 | Resolved: 2025-08-18

## 2025-08-18 PROCEDURE — 99214 OFFICE O/P EST MOD 30 MIN: CPT | Performed by: INTERNAL MEDICINE

## 2025-08-18 PROCEDURE — 4010F ACE/ARB THERAPY RXD/TAKEN: CPT | Performed by: INTERNAL MEDICINE

## 2025-08-18 PROCEDURE — 3074F SYST BP LT 130 MM HG: CPT | Performed by: INTERNAL MEDICINE

## 2025-08-18 PROCEDURE — 3079F DIAST BP 80-89 MM HG: CPT | Performed by: INTERNAL MEDICINE

## 2025-08-18 ASSESSMENT — ENCOUNTER SYMPTOMS
DIARRHEA: 0
DIZZINESS: 0
ARTHRALGIAS: 0
SORE THROAT: 0
PALPITATIONS: 0
ABDOMINAL PAIN: 0
BRUISES/BLEEDS EASILY: 0
HEADACHES: 0
FATIGUE: 0
DIFFICULTY URINATING: 0
HYPERTENSION: 1
FEVER: 0
WHEEZING: 0
SINUS PAIN: 0
COUGH: 0
UNEXPECTED WEIGHT CHANGE: 0
BLOOD IN STOOL: 0

## 2025-08-21 ENCOUNTER — PHARMACY VISIT (OUTPATIENT)
Dept: PHARMACY | Facility: CLINIC | Age: 60
End: 2025-08-21
Payer: COMMERCIAL

## 2025-08-21 PROCEDURE — RXMED WILLOW AMBULATORY MEDICATION CHARGE

## 2026-02-11 ENCOUNTER — APPOINTMENT (OUTPATIENT)
Dept: PRIMARY CARE | Facility: CLINIC | Age: 61
End: 2026-02-11
Payer: COMMERCIAL

## (undated) DEVICE — BAND, VASCULAR, RADIAL HEMOSTAT, EXTRA-LONG 29CM

## (undated) DEVICE — NEEDLE, ENTRY, PERCUTANEOUS, 21 G X 2.5 CM

## (undated) DEVICE — CATHETER, BALLOON DILATION, EUPHORA SEMICOMPLIANT 2.50  X 10 MM X 142CM

## (undated) DEVICE — CATHETER, BALLOON, NC EUPHORA NONCOMPLIANT 3.0 X 15 X 142CM

## (undated) DEVICE — GUIDEWIRE, RUN THROUGH WIRE, 180CM

## (undated) DEVICE — CATHETER, BALLOON, NC EUPHORA NONCOMPLIANT 3.0 X 12 X 142CM

## (undated) DEVICE — ANGIO KIT, LEFT HEART, LF, CUSTOM

## (undated) DEVICE — CATHETER, ANGIO, IMPULSE, FL3.5, 6 FR X 100 CM

## (undated) DEVICE — CATHETER, BALLOON, NC EUPHORA NONCOMPLIANT 3.5 X 12 X 142CM

## (undated) DEVICE — INFLATION DEVICE, BASIXCOMPAX, 30 ATM/BAR, 20ML  MAP152

## (undated) DEVICE — CATHETER, ANGIO, IMPULSE, FR4, 6 FR X 100 CM

## (undated) DEVICE — INTRODUCER SHEATH, GLIDESHEATH, 6FR 10CM

## (undated) DEVICE — CATHETER, GUIDING, LAUNCHER, 6FR, JR 4.0

## (undated) DEVICE — GUIDEWIRE, INQWIRE, 3MM J, .035 X 210CM, FIXED